# Patient Record
Sex: FEMALE | Race: ASIAN | NOT HISPANIC OR LATINO | Employment: FULL TIME | URBAN - METROPOLITAN AREA
[De-identification: names, ages, dates, MRNs, and addresses within clinical notes are randomized per-mention and may not be internally consistent; named-entity substitution may affect disease eponyms.]

---

## 2017-05-02 ENCOUNTER — HOSPITAL ENCOUNTER (OUTPATIENT)
Dept: CARDIOLOGY | Facility: CLINIC | Age: 42
Discharge: HOME | End: 2017-05-02
Payer: MEDICAID

## 2017-05-02 ENCOUNTER — OFFICE VISIT (OUTPATIENT)
Dept: CARDIOTHORACIC SURGERY | Facility: CLINIC | Age: 42
End: 2017-05-02
Payer: MEDICAID

## 2017-05-02 VITALS
DIASTOLIC BLOOD PRESSURE: 81 MMHG | OXYGEN SATURATION: 99 % | BODY MASS INDEX: 26.58 KG/M2 | WEIGHT: 150 LBS | SYSTOLIC BLOOD PRESSURE: 149 MMHG | HEIGHT: 63 IN | HEART RATE: 73 BPM | TEMPERATURE: 98 F

## 2017-05-02 DIAGNOSIS — I06.0 RHEUMATIC AORTIC STENOSIS: Primary | ICD-10-CM

## 2017-05-02 DIAGNOSIS — I05.0 MITRAL VALVE STENOSIS, RHEUMATIC: ICD-10-CM

## 2017-05-02 DIAGNOSIS — I05.0 MITRAL VALVE STENOSIS: ICD-10-CM

## 2017-05-02 LAB
AORTIC VALVE REGURGITATION: ABNORMAL
AORTIC VALVE STENOSIS: ABNORMAL
DIASTOLIC DYSFUNCTION: YES
ESTIMATED PA SYSTOLIC PRESSURE: 39.24
MITRAL VALVE REGURGITATION: ABNORMAL
MITRAL VALVE STENOSIS: ABNORMAL
RETIRED EF AND QEF - SEE NOTES: 65 (ref 55–65)
TRICUSPID VALVE REGURGITATION: ABNORMAL

## 2017-05-02 PROCEDURE — 99213 OFFICE O/P EST LOW 20 MIN: CPT | Mod: 25,PBBFAC | Performed by: THORACIC SURGERY (CARDIOTHORACIC VASCULAR SURGERY)

## 2017-05-02 PROCEDURE — 99999 PR PBB SHADOW E&M-EST. PATIENT-LVL III: CPT | Mod: 25,PBBFAC,, | Performed by: THORACIC SURGERY (CARDIOTHORACIC VASCULAR SURGERY)

## 2017-05-02 PROCEDURE — 93306 TTE W/DOPPLER COMPLETE: CPT | Mod: 26,S$PBB,, | Performed by: INTERNAL MEDICINE

## 2017-05-02 PROCEDURE — 99215 OFFICE O/P EST HI 40 MIN: CPT | Mod: S$PBB,,, | Performed by: THORACIC SURGERY (CARDIOTHORACIC VASCULAR SURGERY)

## 2017-05-02 NOTE — MR AVS SNAPSHOT
[facility] - Cardiovascular Surg  [address]  [facility] [address]  Phone: [phone]                  Rea Ibarra   2017 11:45 AM   Appointment    Description:  Female : 1975   Provider:  Farhat Ortega MD   Department:  [facility] - Cardiovascular Surg                To Do List           Future Appointments        Provider Department Dept Phone    2017 9:30 AM ECHO, [facility] - Echo/Stress Lab [phone]    2017 11:45 AM Farhat Ortega MD [facility] - Cardiovascular Surg [phone]      Goals (5 Years of Data)     None      [facility]     [facility] Nurse Care Line -  Assistance  Unless otherwise directed by your provider, please contact [facility], our nurse care line that is available for  assistance.     Registered nurses in the [facility] On Call Center provide: appointment scheduling, clinical advisement, health education, and other advisory services.  Call: [phone] (toll free)               Medications           Message regarding Medications     Verify the changes and/or additions to your medication regime listed below are the same as discussed with your clinician today.  If any of these changes or additions are incorrect, please notify your healthcare provider.             Verify that the below list of medications is an accurate representation of the medications you are currently taking.  If none reported, the list may be blank. If incorrect, please contact your healthcare provider. Carry this list with you in case of emergency.           Current Medications     ferrous sulfate 325 (65 FE) MG EC tablet Take 325 mg by mouth 3 (three) times daily with meals.    metoprolol tartrate (LOPRESSOR) 25 MG tablet Take 25 mg by mouth 2 (two) times daily.           Clinical Reference Information           Allergies as of 2017     No Known Allergies      Immunizations Administered on Date of Encounter - 2017     None      Smoking  Cessation     If you would like to quit smoking:   You may be eligible for free services if you are a [place] resident and started smoking cigarettes before September 1, 1988.  Call the Smoking Cessation Trust (SCT) toll free at (014) 689-6901 or (901) 949-8429.   Call 1-800-QUIT-NOW if you do not meet the above criteria.   Contact us via email: [email or web address]   View our website for more information: [email or web address]        Language Assistance Services     ATTENTION: Language assistance services are available, free of charge. Please call [phone].      ATENCIÓN: Si habla español, tiene a saldana disposición servicios gratuitos de asistencia lingüística. Llame al [phone].     CHÚ Ý: N?u b?n nói Ti?ng Vi?t, có các d?ch v? h? tr? ngôn ng? mi?n phí dành cho b?n. G?i s? [phone].         [facility] complies with applicable Federal civil rights laws and does not discriminate on the basis of race, color, national origin, age, disability, or sex.

## 2017-05-02 NOTE — PROGRESS NOTES
History & Physical    SUBJECTIVE:     History of Present Illness:  Patient is a 42 y.o. 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 + CURIEL that has forced her to quit working 4 months ago. + syncope (which is new). + angina (also new). She is here for 1 year follow up -- was recommended to have AVR/MVR, but wanted to wait until this summer since her grandchild was graduating. She reports a h/o of a ? cardiac arrest, unknown etiology. Denies TIA symptoms. Smoked pack/day for a year. No illicit drugs or alcohol use.     NHYA Class III    No LHC in our system    STS score 0.765        Chief Complaint   Patient presents with    Follow-up       Review of patient's allergies indicates:  No Known Allergies    Current Outpatient Prescriptions   Medication Sig Dispense Refill    ferrous sulfate 325 (65 FE) MG EC tablet Take 325 mg by mouth 3 (three) times daily with meals.      metoprolol tartrate (LOPRESSOR) 25 MG tablet Take 25 mg by mouth 2 (two) times daily.       No current facility-administered medications for this visit.        Past Medical History:   Diagnosis Date    Aortic stenosis     Bleeding ulcer     Iron deficiency anemia     Mitral stenosis     Myocardial infarction     about 2 years ago per patient     Past Surgical History:   Procedure Laterality Date    MANDIBLE FRACTURE SURGERY       Family History   Problem Relation Age of Onset    No Known Problems Mother     No Known Problems Father      Social History   Substance Use Topics    Smoking status: Current Every Day Smoker     Packs/day: 0.00     Types: Cigarettes    Smokeless tobacco: None      Comment: 2 to 3 cigarettes per week    Alcohol use No        Review of Systems:  Review of Systems   Constitutional: Positive for activity change and fatigue. Negative for appetite change, chills and fever.   HENT: Negative for congestion and trouble swallowing.    Eyes: Negative for visual disturbance.  "  Respiratory: Positive for shortness of breath. Negative for cough.    Cardiovascular: Positive for chest pain. Negative for leg swelling.   Gastrointestinal: Negative for abdominal distention, abdominal pain, constipation, diarrhea, nausea and vomiting.   Endocrine: Negative for cold intolerance and heat intolerance.   Genitourinary: Negative for difficulty urinating and dysuria.   Musculoskeletal: Negative for arthralgias and back pain.   Skin: Negative for rash and wound.   Neurological: Positive for syncope. Negative for dizziness and headaches.   Psychiatric/Behavioral: Negative for agitation and behavioral problems.       OBJECTIVE:     Vital Signs (Most Recent)  Temp: 98.2 °F (36.8 °C) (05/02/17 1124)  Pulse: 73 (05/02/17 1124)  BP: (!) 149/81 (05/02/17 1124)  SpO2: 99 % (05/02/17 1124)  5' 3" (1.6 m)  68 kg (150 lb)   BMI 25    Physical Exam:  Physical Exam   Constitutional: She is oriented to person, place, and time. She appears well-developed and well-nourished. No distress.   Cardiovascular: Normal rate and regular rhythm.  Exam reveals no gallop and no friction rub.    Murmur heard.  Crescendo murmur appreciated   Pulmonary/Chest: Effort normal and breath sounds normal. No respiratory distress. She has no wheezes. She has no rales.   Abdominal: Soft. Bowel sounds are normal.   Musculoskeletal: Normal range of motion. She exhibits no edema.   Neurological: She is alert and oriented to person, place, and time.   Skin: Skin is warm and dry.   Psychiatric: She has a normal mood and affect. Her behavior is normal.       Laboratory  Reviewed    Diagnostic Results:  R/L heart cath [place] 9/1/2015  -No CAD  Ao 97/71  /9  RAP 5  RVP 45/9  PAP 48/24  PCW 21    TTE OSH 6/2016  EF 60%, nml systolic, abnormal diastolic  Moderate MS  Moderate AS 1.1cm2, mean gradient 21.6  LAD 4.8cm severely enlarged  Moderate AR  PASP 23    ASSESSMENT/PLAN:     Ms Ibarra is a 42 y.o. female with RHD with moderate aortic and " mitral stenosis    PLAN:    - Plan for MVR/AVR -- wants mechanically valves -- understands need for lifelong coumadin  - Will discuss need for LHC prior to surgery with Dr Shannon HOLT Attending Note:    I have personally taken the history and examined this patient and agree with the resident's note as stated above. 41 yo F with RHD. Has AS and MS. Now with increased CURIEL and fatigue, had to quit working 4 months ago. I recommended AVR/MVR.  We discussed the risks and benefits of the proposed procedure, including but not limited to death, stroke, respiratory complications, renal complications, arrythmia, need for permanent pacemaker, and infection. Her questions have been answered, and she wishes to proceed. After a discussion of the advantages and disadvantages of mechanical and tissue prostheses, she desires mechanical valves. Her daughter was present for the discussion.

## 2017-05-04 ENCOUNTER — HOSPITAL ENCOUNTER (OUTPATIENT)
Dept: VASCULAR SURGERY | Facility: CLINIC | Age: 42
Discharge: HOME | End: 2017-05-04
Attending: THORACIC SURGERY (CARDIOTHORACIC VASCULAR SURGERY)
Payer: MEDICAID

## 2017-05-04 ENCOUNTER — HOSPITAL ENCOUNTER (OUTPATIENT)
Dept: RADIOLOGY | Facility: HOSPITAL | Age: 42
Discharge: HOME | End: 2017-05-04
Attending: THORACIC SURGERY (CARDIOTHORACIC VASCULAR SURGERY)
Payer: MEDICAID

## 2017-05-04 ENCOUNTER — HOSPITAL ENCOUNTER (OUTPATIENT)
Dept: PREADMISSION TESTING | Facility: HOSPITAL | Age: 42
Discharge: HOME | End: 2017-05-04
Attending: THORACIC SURGERY (CARDIOTHORACIC VASCULAR SURGERY)
Payer: MEDICAID

## 2017-05-04 ENCOUNTER — OFFICE VISIT (OUTPATIENT)
Dept: CARDIOTHORACIC SURGERY | Facility: CLINIC | Age: 42
End: 2017-05-04
Attending: THORACIC SURGERY (CARDIOTHORACIC VASCULAR SURGERY)
Payer: MEDICAID

## 2017-05-04 ENCOUNTER — HOSPITAL ENCOUNTER (OUTPATIENT)
Dept: CARDIOLOGY | Facility: CLINIC | Age: 42
Discharge: HOME | End: 2017-05-04
Attending: THORACIC SURGERY (CARDIOTHORACIC VASCULAR SURGERY)
Payer: MEDICAID

## 2017-05-04 ENCOUNTER — ANESTHESIA EVENT (OUTPATIENT)
Dept: SURGERY | Facility: HOSPITAL | Age: 42
DRG: 220 | End: 2017-05-04
Payer: MEDICAID

## 2017-05-04 ENCOUNTER — HOSPITAL ENCOUNTER (OUTPATIENT)
Dept: PULMONOLOGY | Facility: CLINIC | Age: 42
Discharge: HOME | End: 2017-05-04
Attending: THORACIC SURGERY (CARDIOTHORACIC VASCULAR SURGERY)
Payer: MEDICAID

## 2017-05-04 VITALS
HEIGHT: 62 IN | TEMPERATURE: 98 F | HEART RATE: 58 BPM | OXYGEN SATURATION: 100 % | DIASTOLIC BLOOD PRESSURE: 85 MMHG | SYSTOLIC BLOOD PRESSURE: 133 MMHG | WEIGHT: 153.81 LBS | BODY MASS INDEX: 28.31 KG/M2

## 2017-05-04 VITALS
DIASTOLIC BLOOD PRESSURE: 79 MMHG | WEIGHT: 154.13 LBS | SYSTOLIC BLOOD PRESSURE: 110 MMHG | OXYGEN SATURATION: 98 % | BODY MASS INDEX: 28.36 KG/M2 | HEIGHT: 62 IN | TEMPERATURE: 98 F | HEART RATE: 66 BPM | RESPIRATION RATE: 18 BRPM

## 2017-05-04 DIAGNOSIS — I05.0 MITRAL VALVE STENOSIS, RHEUMATIC: Primary | ICD-10-CM

## 2017-05-04 DIAGNOSIS — I05.0 MITRAL VALVE STENOSIS, RHEUMATIC: ICD-10-CM

## 2017-05-04 DIAGNOSIS — Z01.810 PRE-OPERATIVE CARDIOVASCULAR EXAMINATION: ICD-10-CM

## 2017-05-04 DIAGNOSIS — I06.0 RHEUMATIC AORTIC STENOSIS: ICD-10-CM

## 2017-05-04 LAB
PRE FEV1 FVC: 88
PRE FEV1: 2.74
PRE FVC: 3.13
PREDICTED FEV1 FVC: 85
PREDICTED FEV1: 2.6
PREDICTED FVC: 3.09

## 2017-05-04 PROCEDURE — 94010 BREATHING CAPACITY TEST: CPT | Mod: 26,S$PBB,, | Performed by: INTERNAL MEDICINE

## 2017-05-04 PROCEDURE — 71020 XR CHEST PA AND LATERAL: CPT | Mod: 26,,, | Performed by: RADIOLOGY

## 2017-05-04 PROCEDURE — 99213 OFFICE O/P EST LOW 20 MIN: CPT | Mod: 25,PBBFAC | Performed by: THORACIC SURGERY (CARDIOTHORACIC VASCULAR SURGERY)

## 2017-05-04 PROCEDURE — 93005 ELECTROCARDIOGRAM TRACING: CPT | Mod: PBBFAC | Performed by: INTERNAL MEDICINE

## 2017-05-04 PROCEDURE — 36600 WITHDRAWAL OF ARTERIAL BLOOD: CPT | Mod: 59,S$PBB,, | Performed by: INTERNAL MEDICINE

## 2017-05-04 PROCEDURE — 99999 PR PBB SHADOW E&M-EST. PATIENT-LVL III: CPT | Mod: 25,PBBFAC,, | Performed by: THORACIC SURGERY (CARDIOTHORACIC VASCULAR SURGERY)

## 2017-05-04 PROCEDURE — 93880 EXTRACRANIAL BILAT STUDY: CPT | Mod: 26,S$PBB,, | Performed by: SURGERY

## 2017-05-04 PROCEDURE — 99499 UNLISTED E&M SERVICE: CPT | Mod: S$PBB,,, | Performed by: THORACIC SURGERY (CARDIOTHORACIC VASCULAR SURGERY)

## 2017-05-04 PROCEDURE — 93010 ELECTROCARDIOGRAM REPORT: CPT | Mod: S$PBB,,, | Performed by: INTERNAL MEDICINE

## 2017-05-04 NOTE — MR AVS SNAPSHOT
[facility] - Cardiovascular Surg  [address]  [place] [address]  Phone: [phone]                  Rea Ibarra   2017 4:30 PM   Appointment    Description:  Female : 1975   Provider:  Farhat Ortega MD   Department:  [facility] - Cardiovascular Surg                To Do List           Future Appointments        Provider Department Dept Phone    2017 11:30 AM LAB, APPOINTMENT [place] [facility] [phone]    2017 12:00 PM [facility] [id number] LB LIMIT [facility] [phone]    2017 12:30 PM EKG, APPT [facility] - [phone]    2017  1:00 PM VASCULAR, LAB [facility] Vascular Laboratory [phone]    2017 1:30 PM PULMONARY FUNCTION [facility] Pulmonary Lab [phone]      Your Future Surgeries/Procedures     May 08, 2017   Surgery with Farhat Ortega MD   [facility]    [address] [facility] [address] [phone]              Goals (5 Years of Data)     None      [facility]     [facility] Nurse Care [facility] -  Assistance  Unless otherwise directed by your provider, please contact [facility], our nurse care line that is available for  assistance.     Registered nurses in the [facility] provide: appointment scheduling, clinical advisement, health education, and other advisory services.  Call: [phone] (toll free)               Medications           Message regarding Medications     Verify the changes and/or additions to your medication regime listed below are the same as discussed with your clinician today.  If any of these changes or additions are incorrect, please notify your healthcare provider.             Verify that the below list of medications is an accurate representation of the medications you are currently taking.  If none reported, the list may be blank. If incorrect, please contact your healthcare  provider. Carry this list with you in case of emergency.           Current Medications     ferrous sulfate 325 (65 FE) MG EC tablet Take 325 mg by mouth 3 (three) times daily with meals.    metoprolol tartrate (LOPRESSOR) 25 MG tablet Take 25 mg by mouth 2 (two) times daily.           Clinical Reference Information           Allergies as of 5/4/2017     No Known Allergies      Immunizations Administered on Date of Encounter - 5/4/2017     None      Smoking Cessation     If you would like to quit smoking:   You may be eligible for free services if you are a [place] resident and started smoking cigarettes before September 1, 1988.  Call the Smoking Cessation Trust ([facility]) toll free at (094) 277-4754 or (689) 252-8758.   Call 1-800-QUIT-NOW if you do not meet the above criteria.   Contact us via email: [email or web address]   View our website for more information: [email or web address]        Language Assistance Services     ATTENTION: Language assistance services are available, free of charge. Please call [phone].      ATENCIÓN: Si habla michele, tiene a saldana disposición servicios gratuitos de asistencia lingüística. Llame al [phone].     [facility] Ý: N?u b?n nói Ti?ng Vi?t, có các d?ch v? h? tr? ngôn ng? mi?n phí dành cho b?n. G?i s? [phone].         [facility] complies with applicable Federal civil rights laws and does not discriminate on the basis of race, color, national origin, age, disability, or sex.

## 2017-05-04 NOTE — ANESTHESIA PREPROCEDURE EVALUATION
"                                                                                                             05/04/2017  Pre-operative evaluation for Procedure(s) (LRB):  REPLACEMENT-VALVE-AORTIC (N/A)  REPLACEMENT-VALVE-MITRAL (N/A)    Rea Ibarra is a 42 y.o. female with PMH significant for questionable MI in 2008, moderate MS, and moderate AS (VILLA = 1.37 cm2, peak velocity = 3.28 m/s, mean gradient = 25.13 mmHg) who presents for procedures above.    Pt began to have symptomatic presentation of valvular "several years ago" which led investigation at hospital in [facility]. States that she has episodes of syncope, angina and LUE discomfort, all of which have been increasing in frequency over the years. States that it happens every 4-5 days.    Had recent TTE with results below. EF 60-65%, LAE, PA pressure 39, normal RV function, valvular issues stated above.    Stress test performed on 8/18/15, results located under media tab. Noted to be normal perfusion study with no perfusion defects noted.     LHC performed 9/1/15, located under media tab. Normal coronaries noted.    Prev airway: None on file    Patient Active Problem List   Diagnosis    History of MI (myocardial infarction)    Iron deficiency anemia due to chronic blood loss    IFG (impaired fasting glucose)    Rheumatic aortic stenosis    Mitral valve stenosis, rheumatic    Pre-operative cardiovascular examination       Review of patient's allergies indicates:  No Known Allergies     Current Outpatient Prescriptions on File Prior to Encounter   Medication Sig Dispense Refill    ferrous sulfate 325 (65 FE) MG EC tablet Take 325 mg by mouth 3 (three) times daily with meals.      metoprolol tartrate (LOPRESSOR) 25 MG tablet Take 25 mg by mouth 2 (two) times daily.       No current facility-administered medications on file prior to encounter.        Past Surgical History:   Procedure Laterality Date    MANDIBLE FRACTURE SURGERY         Social History "     Social History    Marital status:      Spouse name: N/A    Number of children: N/A    Years of education: N/A     Occupational History    Not on file.     Social History Main Topics    Smoking status: Current Every Day Smoker     Packs/day: 0.00     Types: Cigarettes    Smokeless tobacco: Not on file      Comment: 2 to 3 cigarettes per week    Alcohol use No    Drug use: No    Sexual activity: Not on file     Other Topics Concern    Not on file     Social History Narrative         Vital Signs Range (Last 24H):         CBC:   Recent Labs      05/04/17   1105   WBC  5.70   RBC  5.30   HGB  16.1*   HCT  46.1   PLT  196   MCV  87   MCH  30.4   MCHC  34.9       CMP:   Recent Labs      05/04/17   1105   NA  138   K  4.1   CL  105   CO2  23   BUN  10   CREATININE  0.9   GLU  88   CALCIUM  10.0   ALBUMIN  4.4   PROT  7.6   ALKPHOS  62   ALT  12   AST  17   BILITOT  1.0       INR  Recent Labs      05/04/17   1105   INR  1.4*   APTT  34.2*       Diagnostic Studies:  CXR 5/4/17:   Abnormal but stable contour of the mediastinum consistent with LEFT atrial appendage thrombus in this patient with a history of aortic and mitral stenosis due to rheumatic valve disease.  Normal pulmonary vascular distribution argues for normal LVEDP in this patient with aortic valve disease.    EKG 7/9/15:  Normal sinus rhythm  First degree AV block  Incomplete right bundle branch block  No previous ECGs available    EKG for 5/4 scheduled    2D Echo 5/2/17:    CONCLUSIONS     1 - Normal left ventricular systolic function (EF 60-65%).     2 - Eccentric hypertrophy.     3 - Severe left atrial enlargement.     4 - Normal right ventricular systolic function .     5 - The estimated PA systolic pressure is 39 mmHg.     6 - Mild to moderate aortic stenosis, VILLA = 1.37 cm2, peak velocity = 3.28 m/s, mean gradient = 25.13 mmHg.     7 - Mild aortic regurgitation.     8 - Moderate mitral stenosis, MVA = 1.22 cm2.     9 - Trivial to mild  mitral regurgitation.     10 - Mild tricuspid regurgitation.  Anesthesia Evaluation    I have reviewed the Patient Summary Reports.    I have reviewed the Nursing Notes.   I have reviewed the Medications.     Review of Systems  Anesthesia Hx:  History of prior surgery of interest to airway management or planning: Denies Family Hx of Anesthesia complications.   Denies Personal Hx of Anesthesia complications.   Social:  Former Smoker, No Alcohol Use Quit smoking a year ago   Cardiovascular:   Exercise tolerance: poor Past MI Denies CAD.    Denies CABG/stent.  Denies Dysrhythmias.  Angina, with exertion CURIEL    Pulmonary:   Denies Asthma.  Denies Recent URI.    Renal/:  Renal/ Normal     Hepatic/GI:  Hepatic/GI Normal    Musculoskeletal:  Musculoskeletal Normal    Neurological:  Neurology Normal    Endocrine:  Endocrine Normal        Physical Exam  General:  Well nourished    Airway/Jaw/Neck:  Airway Findings: Mouth Opening: Normal Tongue: Normal  Mallampati: III  Improves to II with phonation.  TM Distance: Normal, at least 6 cm  Jaw/Neck Findings:  Neck ROM: Normal ROM      Dental:  Dental Findings: Upper Dentures, In tact   Chest/Lungs:  Chest/Lungs Findings: Clear to auscultation, Normal Respiratory Rate     Heart/Vascular:  Heart Findings: Rate: Normal  Rhythm: Regular Rhythm  Heart Murmur  Systolic  Systolic Heart Murmur Description: Apical, R Upper Sternal Border, Holosystolic        Mental Status:  Mental Status Findings:  Cooperative, Alert and Oriented         Anesthesia Plan  Type of Anesthesia, risks & benefits discussed:  Anesthesia Type:  general  Patient's Preference:   Intra-op Monitoring Plan: arterial line, central line and standard ASA monitors  Intra-op Monitoring Plan Comments:   Post Op Pain Control Plan:   Post Op Pain Control Plan Comments:   Induction:   IV and Inhalation  Beta Blocker:  Patient is on a Beta-Blocker and has received one dose within the past 24 hours (No further documentation  required).       Informed Consent:    ASA Score: 4     Day of Surgery Review of History & Physical:        Anesthesia Plan Notes: Discussed intra-op course with patient and family - answered all questions to their satisfaction. Discussed placement of arterial line (potentially awake) and central line. Discussed potential use of BRYANT intra-op. Discussed post-op care that will likely include ICU; also, discussed with patient and family about possibility of patient remaining intubated post-op with extubation to occur when deemed appropriate by primary team.        Ready For Surgery From Anesthesia Perspective.

## 2017-05-04 NOTE — PROGRESS NOTES
History & Physical    SUBJECTIVE:     History of Present Illness:  Patient is a 42 y.o. 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 + CURIEL that has forced her to quit working 4 months ago. + syncope (which is new). + angina (also new). She is here for 1 year follow up -- was recommended to have AVR/MVR, but wanted to wait until this summer since her grandchild was graduating. She reports a h/o of a ? cardiac arrest, unknown etiology. Denies TIA symptoms. Smoked pack/day for a year. No illicit drugs or alcohol use.     NHYA Class III    No LHC in our system    STS score 0.765        Chief Complaint   Patient presents with    Pre-op Exam       Review of patient's allergies indicates:  No Known Allergies    Current Outpatient Prescriptions   Medication Sig Dispense Refill    ferrous sulfate 325 (65 FE) MG EC tablet Take 325 mg by mouth 3 (three) times daily with meals.      metoprolol tartrate (LOPRESSOR) 25 MG tablet Take 25 mg by mouth 2 (two) times daily.       No current facility-administered medications for this visit.        Past Medical History:   Diagnosis Date    Aortic stenosis     Bleeding ulcer     Iron deficiency anemia     Mitral stenosis     Myocardial infarction     about 2 years ago per patient     Past Surgical History:   Procedure Laterality Date    MANDIBLE FRACTURE SURGERY       Family History   Problem Relation Age of Onset    No Known Problems Mother     No Known Problems Father      Social History   Substance Use Topics    Smoking status: Current Every Day Smoker     Packs/day: 0.00     Types: Cigarettes    Smokeless tobacco: None      Comment: 2 to 3 cigarettes per week    Alcohol use No        Review of Systems:  Review of Systems   Constitutional: Positive for activity change and fatigue. Negative for appetite change, chills and fever.   HENT: Negative for congestion and trouble swallowing.    Eyes: Negative for visual disturbance.  "  Respiratory: Positive for shortness of breath. Negative for cough.    Cardiovascular: Positive for chest pain. Negative for leg swelling.   Gastrointestinal: Negative for abdominal distention, abdominal pain, constipation, diarrhea, nausea and vomiting.   Endocrine: Negative for cold intolerance and heat intolerance.   Genitourinary: Negative for difficulty urinating and dysuria.   Musculoskeletal: Negative for arthralgias and back pain.   Skin: Negative for rash and wound.   Neurological: Positive for syncope. Negative for dizziness and headaches.   Psychiatric/Behavioral: Negative for agitation and behavioral problems.       OBJECTIVE:     Vital Signs (Most Recent)  Temp: 98.2 °F (36.8 °C) (05/04/17 1539)  Pulse: (!) 58 (05/04/17 1539)  BP: 133/85 (05/04/17 1539)  SpO2: 100 % (05/04/17 1539)  5' 2" (1.575 m)  69.8 kg (153 lb 12.8 oz)   BMI 25    Physical Exam:  Physical Exam   Constitutional: She is oriented to person, place, and time. She appears well-developed and well-nourished. No distress.   Cardiovascular: Normal rate and regular rhythm.  Exam reveals no gallop and no friction rub.    Murmur heard.  Crescendo murmur appreciated   Pulmonary/Chest: Effort normal and breath sounds normal. No respiratory distress. She has no wheezes. She has no rales.   Abdominal: Soft. Bowel sounds are normal.   Musculoskeletal: Normal range of motion. She exhibits no edema.   Neurological: She is alert and oriented to person, place, and time.   Skin: Skin is warm and dry.   Psychiatric: She has a normal mood and affect. Her behavior is normal.       Laboratory  Reviewed    Diagnostic Results:  R/L heart cath [place] 9/1/2015  -No CAD  Ao 97/71  /9  RAP 5  RVP 45/9  PAP 48/24  PCW 21    TTE OSH 6/2016  EF 60%, nml systolic, abnormal diastolic  Moderate MS  Moderate AS 1.1cm2, mean gradient 21.6  LAD 4.8cm severely enlarged  Moderate AR  PASP 23    ASSESSMENT/PLAN:     Ms Ibarra is a 42 y.o. female with RHD with " moderate aortic and mitral stenosis    PLAN:    - Plan for MVR/AVR -- she desires mechanically valves -- understands need for lifelong coumadin  - Consents obtained      CTS Attending Note:    I have personally taken the history and examined this patient and agree with the resident's note as stated above.  Plan AVR/MVR. Questions answered. She wishes to proceed.

## 2017-05-08 ENCOUNTER — HOSPITAL ENCOUNTER (INPATIENT)
Facility: HOSPITAL | Age: 42
LOS: 5 days | Discharge: HOME | DRG: 220 | End: 2017-05-13
Attending: THORACIC SURGERY (CARDIOTHORACIC VASCULAR SURGERY) | Admitting: THORACIC SURGERY (CARDIOTHORACIC VASCULAR SURGERY)
Payer: MEDICAID

## 2017-05-08 ENCOUNTER — ANESTHESIA (OUTPATIENT)
Dept: SURGERY | Facility: HOSPITAL | Age: 42
DRG: 220 | End: 2017-05-08
Payer: MEDICAID

## 2017-05-08 ENCOUNTER — SURGERY (OUTPATIENT)
Age: 42
End: 2017-05-08

## 2017-05-08 DIAGNOSIS — Z99.11 ENCOUNTER FOR WEANING FROM VENTILATOR: ICD-10-CM

## 2017-05-08 DIAGNOSIS — Z95.2 S/P AVR (AORTIC VALVE REPLACEMENT): ICD-10-CM

## 2017-05-08 DIAGNOSIS — I48.0 PAROXYSMAL A-FIB: ICD-10-CM

## 2017-05-08 DIAGNOSIS — I05.0 MITRAL VALVE STENOSIS, RHEUMATIC: ICD-10-CM

## 2017-05-08 DIAGNOSIS — R73.9 STRESS HYPERGLYCEMIA: ICD-10-CM

## 2017-05-08 DIAGNOSIS — E83.39 HYPOPHOSPHATEMIA: Primary | ICD-10-CM

## 2017-05-08 DIAGNOSIS — I35.0 AORTIC STENOSIS: ICD-10-CM

## 2017-05-08 DIAGNOSIS — I49.8 ATRIAL ARRHYTHMIA: ICD-10-CM

## 2017-05-08 DIAGNOSIS — Z01.810 PRE-OPERATIVE CARDIOVASCULAR EXAMINATION: ICD-10-CM

## 2017-05-08 DIAGNOSIS — D50.0 IRON DEFICIENCY ANEMIA DUE TO CHRONIC BLOOD LOSS: ICD-10-CM

## 2017-05-08 DIAGNOSIS — Z95.2 S/P MVR (MITRAL VALVE REPLACEMENT): ICD-10-CM

## 2017-05-08 DIAGNOSIS — I48.91 ATRIAL FIBRILLATION: ICD-10-CM

## 2017-05-08 DIAGNOSIS — R73.01 IFG (IMPAIRED FASTING GLUCOSE): ICD-10-CM

## 2017-05-08 DIAGNOSIS — I06.0 RHEUMATIC AORTIC STENOSIS: ICD-10-CM

## 2017-05-08 DIAGNOSIS — I25.2 HISTORY OF MI (MYOCARDIAL INFARCTION): ICD-10-CM

## 2017-05-08 DIAGNOSIS — Z98.890 STATUS POST CARDIAC SURGERY: ICD-10-CM

## 2017-05-08 LAB
ALLENS TEST: ABNORMAL
ANION GAP SERPL CALC-SCNC: 8 MMOL/L
APTT BLDCRRT: 33.8 SEC
BASOPHILS # BLD AUTO: 0.03 K/UL
BASOPHILS NFR BLD: 0.2 %
BUN SERPL-MCNC: 9 MG/DL
CALCIUM SERPL-MCNC: 7.7 MG/DL
CHLORIDE SERPL-SCNC: 112 MMOL/L
CO2 SERPL-SCNC: 24 MMOL/L
CREAT SERPL-MCNC: 0.8 MG/DL
DELSYS: ABNORMAL
DIFFERENTIAL METHOD: ABNORMAL
EOSINOPHIL # BLD AUTO: 0.1 K/UL
EOSINOPHIL NFR BLD: 0.8 %
ERYTHROCYTE [DISTWIDTH] IN BLOOD BY AUTOMATED COUNT: 12.8 %
ERYTHROCYTE [SEDIMENTATION RATE] IN BLOOD BY WESTERGREN METHOD: 14 MM/H
EST. GFR  (AFRICAN AMERICAN): >60 ML/MIN/1.73 M^2
EST. GFR  (NON AFRICAN AMERICAN): >60 ML/MIN/1.73 M^2
FIBRINOGEN PPP-MCNC: 124 MG/DL
FIO2: 100
FIO2: 60
FIO2: 60
FIO2: 70
FIO2: 80
FIO2: 80
FLOW: 60
GLUCOSE SERPL-MCNC: 100 MG/DL (ref 70–110)
GLUCOSE SERPL-MCNC: 149 MG/DL (ref 70–110)
GLUCOSE SERPL-MCNC: 162 MG/DL
GLUCOSE SERPL-MCNC: 167 MG/DL (ref 70–110)
GLUCOSE SERPL-MCNC: 179 MG/DL (ref 70–110)
GLUCOSE SERPL-MCNC: 184 MG/DL (ref 70–110)
GLUCOSE SERPL-MCNC: 205 MG/DL (ref 70–110)
GLUCOSE SERPL-MCNC: 98 MG/DL (ref 70–110)
HCO3 UR-SCNC: 20.9 MMOL/L (ref 24–28)
HCO3 UR-SCNC: 21.3 MMOL/L (ref 24–28)
HCO3 UR-SCNC: 24.2 MMOL/L (ref 24–28)
HCO3 UR-SCNC: 24.5 MMOL/L (ref 24–28)
HCO3 UR-SCNC: 25 MMOL/L (ref 24–28)
HCO3 UR-SCNC: 25.3 MMOL/L (ref 24–28)
HCO3 UR-SCNC: 27.1 MMOL/L (ref 24–28)
HCO3 UR-SCNC: 29 MMOL/L (ref 24–28)
HCT VFR BLD AUTO: 33.8 %
HCT VFR BLD CALC: 28 %PCV (ref 36–54)
HCT VFR BLD CALC: 31 %PCV (ref 36–54)
HCT VFR BLD CALC: 33 %PCV (ref 36–54)
HCT VFR BLD CALC: 35 %PCV (ref 36–54)
HCT VFR BLD CALC: 40 %PCV (ref 36–54)
HGB BLD-MCNC: 11.2 G/DL
INR PPP: 1.3
LYMPHOCYTES # BLD AUTO: 2.8 K/UL
LYMPHOCYTES NFR BLD: 16.4 %
MAGNESIUM SERPL-MCNC: 2.6 MG/DL
MCH RBC QN AUTO: 29.6 PG
MCHC RBC AUTO-ENTMCNC: 33.1 %
MCV RBC AUTO: 89 FL
MODE: ABNORMAL
MONOCYTES # BLD AUTO: 0.9 K/UL
MONOCYTES NFR BLD: 5.1 %
NEUTROPHILS # BLD AUTO: 13.4 K/UL
NEUTROPHILS NFR BLD: 77.3 %
PCO2 BLDA: 29.1 MMHG (ref 35–45)
PCO2 BLDA: 32.4 MMHG (ref 35–45)
PCO2 BLDA: 35.2 MMHG (ref 35–45)
PCO2 BLDA: 37.8 MMHG (ref 35–45)
PCO2 BLDA: 39.5 MMHG (ref 35–45)
PCO2 BLDA: 40.9 MMHG (ref 35–45)
PCO2 BLDA: 58.1 MMHG (ref 35–45)
PCO2 BLDA: 62.4 MMHG (ref 35–45)
PEEP: 5
PH SMN: 7.28 [PH] (ref 7.35–7.45)
PH SMN: 7.28 [PH] (ref 7.35–7.45)
PH SMN: 7.38 [PH] (ref 7.35–7.45)
PH SMN: 7.41 [PH] (ref 7.35–7.45)
PH SMN: 7.42 [PH] (ref 7.35–7.45)
PH SMN: 7.42 [PH] (ref 7.35–7.45)
PH SMN: 7.46 [PH] (ref 7.35–7.45)
PH SMN: 7.47 [PH] (ref 7.35–7.45)
PHOSPHATE SERPL-MCNC: 3 MG/DL
PIP: 23
PLATELET # BLD AUTO: 110 K/UL
PMV BLD AUTO: 9.4 FL
PO2 BLDA: 145 MMHG (ref 80–100)
PO2 BLDA: 165 MMHG (ref 80–100)
PO2 BLDA: 40 MMHG (ref 40–60)
PO2 BLDA: 40 MMHG (ref 40–60)
PO2 BLDA: 43 MMHG (ref 40–60)
PO2 BLDA: 500 MMHG (ref 80–100)
PO2 BLDA: 55 MMHG (ref 40–60)
PO2 BLDA: 58 MMHG (ref 40–60)
POC BE: -1 MMOL/L
POC BE: -2 MMOL/L
POC BE: -4 MMOL/L
POC BE: 0 MMOL/L
POC BE: 1 MMOL/L
POC BE: 2 MMOL/L
POC IONIZED CALCIUM: 0.98 MMOL/L (ref 1.06–1.42)
POC IONIZED CALCIUM: 1.02 MMOL/L (ref 1.06–1.42)
POC IONIZED CALCIUM: 1.02 MMOL/L (ref 1.06–1.42)
POC IONIZED CALCIUM: 1.03 MMOL/L (ref 1.06–1.42)
POC IONIZED CALCIUM: 1.05 MMOL/L (ref 1.06–1.42)
POC IONIZED CALCIUM: 1.07 MMOL/L (ref 1.06–1.42)
POC IONIZED CALCIUM: 1.11 MMOL/L (ref 1.06–1.42)
POC IONIZED CALCIUM: 1.16 MMOL/L (ref 1.06–1.42)
POC SATURATED O2: 100 % (ref 95–100)
POC SATURATED O2: 100 % (ref 95–100)
POC SATURATED O2: 79 % (ref 95–100)
POC SATURATED O2: 80 % (ref 95–100)
POC SATURATED O2: 80 % (ref 95–100)
POC SATURATED O2: 83 % (ref 95–100)
POC SATURATED O2: 85 % (ref 95–100)
POC SATURATED O2: 99 % (ref 95–100)
POC TCO2: 22 MMOL/L (ref 24–29)
POC TCO2: 22 MMOL/L (ref 24–29)
POC TCO2: 25 MMOL/L (ref 23–27)
POC TCO2: 26 MMOL/L (ref 23–27)
POC TCO2: 26 MMOL/L (ref 23–27)
POC TCO2: 26 MMOL/L (ref 24–29)
POC TCO2: 29 MMOL/L (ref 24–29)
POC TCO2: 31 MMOL/L (ref 24–29)
POCT GLUCOSE: 138 MG/DL (ref 70–110)
POCT GLUCOSE: 140 MG/DL (ref 70–110)
POCT GLUCOSE: 144 MG/DL (ref 70–110)
POCT GLUCOSE: 176 MG/DL (ref 70–110)
POTASSIUM BLD-SCNC: 3.3 MMOL/L (ref 3.5–5.1)
POTASSIUM BLD-SCNC: 3.7 MMOL/L (ref 3.5–5.1)
POTASSIUM BLD-SCNC: 3.8 MMOL/L (ref 3.5–5.1)
POTASSIUM BLD-SCNC: 3.9 MMOL/L (ref 3.5–5.1)
POTASSIUM BLD-SCNC: 4 MMOL/L (ref 3.5–5.1)
POTASSIUM BLD-SCNC: 4.4 MMOL/L (ref 3.5–5.1)
POTASSIUM BLD-SCNC: 4.8 MMOL/L (ref 3.5–5.1)
POTASSIUM BLD-SCNC: 4.9 MMOL/L (ref 3.5–5.1)
POTASSIUM SERPL-SCNC: 3.5 MMOL/L
POTASSIUM SERPL-SCNC: 3.5 MMOL/L
POTASSIUM SERPL-SCNC: 3.7 MMOL/L
PROTHROMBIN TIME: 13.3 SEC
PS: 10
RBC # BLD AUTO: 3.79 M/UL
SAMPLE: ABNORMAL
SITE: ABNORMAL
SODIUM BLD-SCNC: 141 MMOL/L (ref 136–145)
SODIUM BLD-SCNC: 143 MMOL/L (ref 136–145)
SODIUM BLD-SCNC: 144 MMOL/L (ref 136–145)
SODIUM BLD-SCNC: 145 MMOL/L (ref 136–145)
SODIUM SERPL-SCNC: 144 MMOL/L
SP02: 100
VT: 500
WBC # BLD AUTO: 17.36 K/UL

## 2017-05-08 PROCEDURE — 27201423 OPTIME MED/SURG SUP & DEVICES STERILE SUPPLY: Performed by: THORACIC SURGERY (CARDIOTHORACIC VASCULAR SURGERY)

## 2017-05-08 PROCEDURE — 86920 COMPATIBILITY TEST SPIN: CPT

## 2017-05-08 PROCEDURE — C1729 CATH, DRAINAGE: HCPCS | Performed by: THORACIC SURGERY (CARDIOTHORACIC VASCULAR SURGERY)

## 2017-05-08 PROCEDURE — 37799 UNLISTED PX VASCULAR SURGERY: CPT

## 2017-05-08 PROCEDURE — 37000008 HC ANESTHESIA 1ST 15 MINUTES: Performed by: THORACIC SURGERY (CARDIOTHORACIC VASCULAR SURGERY)

## 2017-05-08 PROCEDURE — 27200678 HC TRANSDUCER MONITOR KIT TRIPLE: Performed by: ANESTHESIOLOGY

## 2017-05-08 PROCEDURE — 84295 ASSAY OF SERUM SODIUM: CPT

## 2017-05-08 PROCEDURE — 88305 TISSUE EXAM BY PATHOLOGIST: CPT | Performed by: PATHOLOGY

## 2017-05-08 PROCEDURE — 85014 HEMATOCRIT: CPT

## 2017-05-08 PROCEDURE — 82803 BLOOD GASES ANY COMBINATION: CPT

## 2017-05-08 PROCEDURE — C1751 CATH, INF, PER/CENT/MIDLINE: HCPCS | Performed by: ANESTHESIOLOGY

## 2017-05-08 PROCEDURE — 33430 REPLACEMENT OF MITRAL VALVE: CPT | Mod: ,,, | Performed by: THORACIC SURGERY (CARDIOTHORACIC VASCULAR SURGERY)

## 2017-05-08 PROCEDURE — 5A1221Z PERFORMANCE OF CARDIAC OUTPUT, CONTINUOUS: ICD-10-PCS | Performed by: THORACIC SURGERY (CARDIOTHORACIC VASCULAR SURGERY)

## 2017-05-08 PROCEDURE — 85384 FIBRINOGEN ACTIVITY: CPT

## 2017-05-08 PROCEDURE — 25000003 PHARM REV CODE 250

## 2017-05-08 PROCEDURE — 27100088 HC CELL SAVER

## 2017-05-08 PROCEDURE — 27000221 HC OXYGEN, UP TO 24 HOURS

## 2017-05-08 PROCEDURE — 84132 ASSAY OF SERUM POTASSIUM: CPT

## 2017-05-08 PROCEDURE — 27100025 HC TUBING, SET FLUID WARMER: Performed by: ANESTHESIOLOGY

## 2017-05-08 PROCEDURE — 63600175 PHARM REV CODE 636 W HCPCS: Performed by: ANESTHESIOLOGY

## 2017-05-08 PROCEDURE — 02RF0JZ REPLACEMENT OF AORTIC VALVE WITH SYNTHETIC SUBSTITUTE, OPEN APPROACH: ICD-10-PCS | Performed by: THORACIC SURGERY (CARDIOTHORACIC VASCULAR SURGERY)

## 2017-05-08 PROCEDURE — 36620 INSERTION CATHETER ARTERY: CPT | Mod: 59,,, | Performed by: ANESTHESIOLOGY

## 2017-05-08 PROCEDURE — 27000191 HC C-V MONITORING

## 2017-05-08 PROCEDURE — 94003 VENT MGMT INPAT SUBQ DAY: CPT

## 2017-05-08 PROCEDURE — P9045 ALBUMIN (HUMAN), 5%, 250 ML: HCPCS

## 2017-05-08 PROCEDURE — 85025 COMPLETE CBC W/AUTO DIFF WBC: CPT

## 2017-05-08 PROCEDURE — 27200953 HC CARDIOPLEGIA SYSTEM

## 2017-05-08 PROCEDURE — 99232 SBSQ HOSP IP/OBS MODERATE 35: CPT | Mod: ,,, | Performed by: NURSE PRACTITIONER

## 2017-05-08 PROCEDURE — 83735 ASSAY OF MAGNESIUM: CPT

## 2017-05-08 PROCEDURE — 37000009 HC ANESTHESIA EA ADD 15 MINS: Performed by: THORACIC SURGERY (CARDIOTHORACIC VASCULAR SURGERY)

## 2017-05-08 PROCEDURE — 82330 ASSAY OF CALCIUM: CPT

## 2017-05-08 PROCEDURE — 02RG0JZ REPLACEMENT OF MITRAL VALVE WITH SYNTHETIC SUBSTITUTE, OPEN APPROACH: ICD-10-PCS | Performed by: THORACIC SURGERY (CARDIOTHORACIC VASCULAR SURGERY)

## 2017-05-08 PROCEDURE — 36000712 HC OR TIME LEV V 1ST 15 MIN: Performed by: THORACIC SURGERY (CARDIOTHORACIC VASCULAR SURGERY)

## 2017-05-08 PROCEDURE — 27800903 OPTIME MED/SURG SUP & DEVICES OTHER IMPLANTS: Performed by: THORACIC SURGERY (CARDIOTHORACIC VASCULAR SURGERY)

## 2017-05-08 PROCEDURE — 63600175 PHARM REV CODE 636 W HCPCS

## 2017-05-08 PROCEDURE — 85520 HEPARIN ASSAY: CPT

## 2017-05-08 PROCEDURE — 36556 INSERT NON-TUNNEL CV CATH: CPT | Mod: 59,,, | Performed by: ANESTHESIOLOGY

## 2017-05-08 PROCEDURE — D9220A PRA ANESTHESIA: Mod: ,,, | Performed by: ANESTHESIOLOGY

## 2017-05-08 PROCEDURE — 94761 N-INVAS EAR/PLS OXIMETRY MLT: CPT

## 2017-05-08 PROCEDURE — 99223 1ST HOSP IP/OBS HIGH 75: CPT | Mod: ,,, | Performed by: SURGERY

## 2017-05-08 PROCEDURE — 25000003 PHARM REV CODE 250: Performed by: THORACIC SURGERY (CARDIOTHORACIC VASCULAR SURGERY)

## 2017-05-08 PROCEDURE — 36000713 HC OR TIME LEV V EA ADD 15 MIN: Performed by: THORACIC SURGERY (CARDIOTHORACIC VASCULAR SURGERY)

## 2017-05-08 PROCEDURE — 27000175 HC ADULT BYPASS PUMP

## 2017-05-08 PROCEDURE — 84100 ASSAY OF PHOSPHORUS: CPT

## 2017-05-08 PROCEDURE — 93010 ELECTROCARDIOGRAM REPORT: CPT | Mod: ,,, | Performed by: INTERNAL MEDICINE

## 2017-05-08 PROCEDURE — 89220 SPUTUM SPECIMEN COLLECTION: CPT

## 2017-05-08 PROCEDURE — 88305 TISSUE EXAM BY PATHOLOGIST: CPT | Mod: 26,,, | Performed by: PATHOLOGY

## 2017-05-08 PROCEDURE — 27100021 HC MULTIPORT INFUSION MANIFOLD: Performed by: ANESTHESIOLOGY

## 2017-05-08 PROCEDURE — 85610 PROTHROMBIN TIME: CPT

## 2017-05-08 PROCEDURE — 20000000 HC ICU ROOM

## 2017-05-08 PROCEDURE — 25000003 PHARM REV CODE 250: Performed by: ANESTHESIOLOGY

## 2017-05-08 PROCEDURE — 27201037 HC PRESSURE MONITORING SET UP

## 2017-05-08 PROCEDURE — 36592 COLLECT BLOOD FROM PICC: CPT

## 2017-05-08 PROCEDURE — 27800595 HC HEART VALVES

## 2017-05-08 PROCEDURE — 80048 BASIC METABOLIC PNL TOTAL CA: CPT

## 2017-05-08 PROCEDURE — 93312 ECHO TRANSESOPHAGEAL: CPT | Mod: 26,59,, | Performed by: ANESTHESIOLOGY

## 2017-05-08 PROCEDURE — 85730 THROMBOPLASTIN TIME PARTIAL: CPT

## 2017-05-08 PROCEDURE — 33405 REPLACEMENT AORTIC VALVE OPN: CPT | Mod: 51,,, | Performed by: THORACIC SURGERY (CARDIOTHORACIC VASCULAR SURGERY)

## 2017-05-08 PROCEDURE — 94002 VENT MGMT INPAT INIT DAY: CPT

## 2017-05-08 DEVICE — IMPLANTABLE DEVICE: Type: IMPLANTABLE DEVICE | Site: HEART | Status: FUNCTIONAL

## 2017-05-08 RX ORDER — LIDOCAINE HYDROCHLORIDE 10 MG/ML
1 INJECTION, SOLUTION EPIDURAL; INFILTRATION; INTRACAUDAL; PERINEURAL ONCE
Status: DISCONTINUED | OUTPATIENT
Start: 2017-05-08 | End: 2017-05-09

## 2017-05-08 RX ORDER — HYDROCODONE BITARTRATE AND ACETAMINOPHEN 500; 5 MG/1; MG/1
TABLET ORAL
Status: DISCONTINUED | OUTPATIENT
Start: 2017-05-08 | End: 2017-05-09

## 2017-05-08 RX ORDER — MIDAZOLAM HYDROCHLORIDE 1 MG/ML
INJECTION, SOLUTION INTRAMUSCULAR; INTRAVENOUS
Status: DISCONTINUED | OUTPATIENT
Start: 2017-05-08 | End: 2017-05-08

## 2017-05-08 RX ORDER — NICARDIPINE HYDROCHLORIDE 0.2 MG/ML
5 INJECTION INTRAVENOUS CONTINUOUS
Status: DISCONTINUED | OUTPATIENT
Start: 2017-05-08 | End: 2017-05-09

## 2017-05-08 RX ORDER — NITROGLYCERIN 5 MG/ML
INJECTION, SOLUTION INTRAVENOUS
Status: DISCONTINUED | OUTPATIENT
Start: 2017-05-08 | End: 2017-05-08

## 2017-05-08 RX ORDER — LIDOCAINE HYDROCHLORIDE 10 MG/ML
1 INJECTION, SOLUTION EPIDURAL; INFILTRATION; INTRACAUDAL; PERINEURAL ONCE
Status: DISCONTINUED | OUTPATIENT
Start: 2017-05-08 | End: 2017-05-08 | Stop reason: HOSPADM

## 2017-05-08 RX ORDER — CALCIUM GLUCONATE 98 MG/ML
INJECTION, SOLUTION INTRAVENOUS
Status: DISCONTINUED | OUTPATIENT
Start: 2017-05-08 | End: 2017-05-08

## 2017-05-08 RX ORDER — NOREPINEPHRINE BITARTRATE 1 MG/ML
INJECTION, SOLUTION INTRAVENOUS
Status: DISCONTINUED | OUTPATIENT
Start: 2017-05-08 | End: 2017-05-08

## 2017-05-08 RX ORDER — IPRATROPIUM BROMIDE AND ALBUTEROL SULFATE 2.5; .5 MG/3ML; MG/3ML
3 SOLUTION RESPIRATORY (INHALATION) EVERY 6 HOURS PRN
Status: DISCONTINUED | OUTPATIENT
Start: 2017-05-08 | End: 2017-05-13

## 2017-05-08 RX ORDER — MORPHINE SULFATE 2 MG/ML
2 INJECTION, SOLUTION INTRAMUSCULAR; INTRAVENOUS
Status: DISCONTINUED | OUTPATIENT
Start: 2017-05-08 | End: 2017-05-13

## 2017-05-08 RX ORDER — ONDANSETRON HYDROCHLORIDE 4 MG/5ML
4 SOLUTION ORAL ONCE
Status: DISCONTINUED | OUTPATIENT
Start: 2017-05-08 | End: 2017-05-09

## 2017-05-08 RX ORDER — FENTANYL CITRATE 50 UG/ML
INJECTION, SOLUTION INTRAMUSCULAR; INTRAVENOUS
Status: DISCONTINUED | OUTPATIENT
Start: 2017-05-08 | End: 2017-05-08

## 2017-05-08 RX ORDER — ASCORBIC ACID 500 MG
500 TABLET ORAL 2 TIMES DAILY
Status: DISCONTINUED | OUTPATIENT
Start: 2017-05-08 | End: 2017-05-13 | Stop reason: HOSPADM

## 2017-05-08 RX ORDER — CEFAZOLIN SODIUM 2 G/50ML
2 SOLUTION INTRAVENOUS
Status: COMPLETED | OUTPATIENT
Start: 2017-05-08 | End: 2017-05-09

## 2017-05-08 RX ORDER — ROCURONIUM BROMIDE 10 MG/ML
INJECTION, SOLUTION INTRAVENOUS
Status: DISCONTINUED | OUTPATIENT
Start: 2017-05-08 | End: 2017-05-08

## 2017-05-08 RX ORDER — AMOXICILLIN 250 MG
1 CAPSULE ORAL 2 TIMES DAILY
Status: DISCONTINUED | OUTPATIENT
Start: 2017-05-08 | End: 2017-05-13

## 2017-05-08 RX ORDER — MUPIROCIN 20 MG/G
1 OINTMENT TOPICAL 2 TIMES DAILY
Status: COMPLETED | OUTPATIENT
Start: 2017-05-08 | End: 2017-05-11

## 2017-05-08 RX ORDER — OXYCODONE HYDROCHLORIDE 5 MG/1
5 TABLET ORAL EVERY 4 HOURS PRN
Status: DISCONTINUED | OUTPATIENT
Start: 2017-05-08 | End: 2017-05-09

## 2017-05-08 RX ORDER — DEXTROSE MONOHYDRATE, SODIUM CHLORIDE, AND POTASSIUM CHLORIDE 50; 1.49; 4.5 G/1000ML; G/1000ML; G/1000ML
INJECTION, SOLUTION INTRAVENOUS CONTINUOUS
Status: DISCONTINUED | OUTPATIENT
Start: 2017-05-08 | End: 2017-05-11

## 2017-05-08 RX ORDER — ALBUMIN HUMAN 50 G/1000ML
25 SOLUTION INTRAVENOUS ONCE
Status: COMPLETED | OUTPATIENT
Start: 2017-05-08 | End: 2017-05-08

## 2017-05-08 RX ORDER — LIDOCAINE HYDROCHLORIDE 10 MG/ML
1 INJECTION, SOLUTION EPIDURAL; INFILTRATION; INTRACAUDAL; PERINEURAL ONCE
Status: COMPLETED | OUTPATIENT
Start: 2017-05-08 | End: 2017-05-08

## 2017-05-08 RX ORDER — ONDANSETRON 2 MG/ML
INJECTION INTRAMUSCULAR; INTRAVENOUS
Status: DISPENSED
Start: 2017-05-08 | End: 2017-05-09

## 2017-05-08 RX ORDER — LIDOCAINE HCL/PF 100 MG/5ML
SYRINGE (ML) INTRAVENOUS
Status: DISCONTINUED | OUTPATIENT
Start: 2017-05-08 | End: 2017-05-08

## 2017-05-08 RX ORDER — NICARDIPINE HYDROCHLORIDE 0.2 MG/ML
INJECTION INTRAVENOUS CONTINUOUS PRN
Status: DISCONTINUED | OUTPATIENT
Start: 2017-05-08 | End: 2017-05-08

## 2017-05-08 RX ORDER — ALBUMIN HUMAN 50 G/1000ML
SOLUTION INTRAVENOUS
Status: COMPLETED
Start: 2017-05-08 | End: 2017-05-08

## 2017-05-08 RX ORDER — ACETAMINOPHEN 10 MG/ML
INJECTION, SOLUTION INTRAVENOUS
Status: DISCONTINUED | OUTPATIENT
Start: 2017-05-08 | End: 2017-05-08

## 2017-05-08 RX ORDER — MAGNESIUM SULFATE HEPTAHYDRATE 40 MG/ML
2 INJECTION, SOLUTION INTRAVENOUS
Status: DISCONTINUED | OUTPATIENT
Start: 2017-05-08 | End: 2017-05-10

## 2017-05-08 RX ORDER — AMINOCAPROIC ACID 250 MG/ML
INJECTION, SOLUTION INTRAVENOUS
Status: DISCONTINUED | OUTPATIENT
Start: 2017-05-08 | End: 2017-05-08

## 2017-05-08 RX ORDER — POTASSIUM CHLORIDE 29.8 MG/ML
40 INJECTION INTRAVENOUS
Status: DISCONTINUED | OUTPATIENT
Start: 2017-05-08 | End: 2017-05-10

## 2017-05-08 RX ORDER — NOREPINEPHRINE BITARTRATE/D5W 4MG/250ML
0.02 PLASTIC BAG, INJECTION (ML) INTRAVENOUS CONTINUOUS
Status: DISCONTINUED | OUTPATIENT
Start: 2017-05-08 | End: 2017-05-09

## 2017-05-08 RX ORDER — ONDANSETRON 8 MG/1
8 TABLET, ORALLY DISINTEGRATING ORAL EVERY 8 HOURS PRN
Status: DISCONTINUED | OUTPATIENT
Start: 2017-05-08 | End: 2017-05-08

## 2017-05-08 RX ORDER — ETOMIDATE 2 MG/ML
INJECTION INTRAVENOUS
Status: DISCONTINUED | OUTPATIENT
Start: 2017-05-08 | End: 2017-05-08

## 2017-05-08 RX ORDER — ONDANSETRON 2 MG/ML
4 INJECTION INTRAMUSCULAR; INTRAVENOUS EVERY 8 HOURS PRN
Status: DISCONTINUED | OUTPATIENT
Start: 2017-05-08 | End: 2017-05-13

## 2017-05-08 RX ORDER — HEPARIN SODIUM 5000 [USP'U]/ML
INJECTION, SOLUTION INTRAVENOUS; SUBCUTANEOUS
Status: DISCONTINUED | OUTPATIENT
Start: 2017-05-08 | End: 2017-05-08

## 2017-05-08 RX ORDER — BACITRACIN 50000 [IU]/1
INJECTION, POWDER, FOR SOLUTION INTRAMUSCULAR
Status: DISCONTINUED | OUTPATIENT
Start: 2017-05-08 | End: 2017-05-08 | Stop reason: HOSPADM

## 2017-05-08 RX ORDER — ALBUMIN HUMAN 50 G/1000ML
12.5 SOLUTION INTRAVENOUS ONCE
Status: COMPLETED | OUTPATIENT
Start: 2017-05-08 | End: 2017-05-08

## 2017-05-08 RX ORDER — PROPOFOL 10 MG/ML
VIAL (ML) INTRAVENOUS CONTINUOUS PRN
Status: DISCONTINUED | OUTPATIENT
Start: 2017-05-08 | End: 2017-05-08

## 2017-05-08 RX ORDER — POTASSIUM CHLORIDE 14.9 MG/ML
20 INJECTION INTRAVENOUS
Status: DISCONTINUED | OUTPATIENT
Start: 2017-05-08 | End: 2017-05-10

## 2017-05-08 RX ORDER — ASPIRIN 325 MG
325 TABLET ORAL DAILY
Status: DISCONTINUED | OUTPATIENT
Start: 2017-05-08 | End: 2017-05-09

## 2017-05-08 RX ORDER — ACETAMINOPHEN 325 MG/1
650 TABLET ORAL EVERY 6 HOURS PRN
Status: DISCONTINUED | OUTPATIENT
Start: 2017-05-08 | End: 2017-05-13

## 2017-05-08 RX ORDER — POTASSIUM CHLORIDE 14.9 MG/ML
40 INJECTION INTRAVENOUS
Status: DISCONTINUED | OUTPATIENT
Start: 2017-05-08 | End: 2017-05-10

## 2017-05-08 RX ORDER — PROTAMINE SULFATE 10 MG/ML
INJECTION, SOLUTION INTRAVENOUS
Status: DISCONTINUED | OUTPATIENT
Start: 2017-05-08 | End: 2017-05-08

## 2017-05-08 RX ADMIN — FENTANYL CITRATE 50 MCG: 50 INJECTION, SOLUTION INTRAMUSCULAR; INTRAVENOUS at 12:05

## 2017-05-08 RX ADMIN — AMINOCAPROIC ACID 10 G: 250 INJECTION, SOLUTION INTRAVENOUS at 07:05

## 2017-05-08 RX ADMIN — FENTANYL CITRATE 100 MCG: 50 INJECTION, SOLUTION INTRAMUSCULAR; INTRAVENOUS at 07:05

## 2017-05-08 RX ADMIN — FENTANYL CITRATE 150 MCG: 50 INJECTION, SOLUTION INTRAMUSCULAR; INTRAVENOUS at 09:05

## 2017-05-08 RX ADMIN — ASPIRIN 325 MG ORAL TABLET 325 MG: 325 PILL ORAL at 04:05

## 2017-05-08 RX ADMIN — NITROGLYCERIN 50 MCG: 5 INJECTION, SOLUTION INTRAVENOUS at 12:05

## 2017-05-08 RX ADMIN — MORPHINE SULFATE 2 MG: 2 INJECTION, SOLUTION INTRAMUSCULAR; INTRAVENOUS at 10:05

## 2017-05-08 RX ADMIN — CALCIUM GLUCONATE 1 G: 94 INJECTION, SOLUTION INTRAVENOUS at 12:05

## 2017-05-08 RX ADMIN — FENTANYL CITRATE 100 MCG: 50 INJECTION, SOLUTION INTRAMUSCULAR; INTRAVENOUS at 12:05

## 2017-05-08 RX ADMIN — DEXTROSE MONOHYDRATE, SODIUM CHLORIDE, AND POTASSIUM CHLORIDE: 50; 4.5; 1.49 INJECTION, SOLUTION INTRAVENOUS at 03:05

## 2017-05-08 RX ADMIN — PROTAMINE SULFATE 160 MG: 10 INJECTION, SOLUTION INTRAVENOUS at 12:05

## 2017-05-08 RX ADMIN — ETOMIDATE 22 MG: 2 INJECTION, SOLUTION INTRAVENOUS at 07:05

## 2017-05-08 RX ADMIN — NOREPINEPHRINE BITARTRATE 0.01 MG: 1 INJECTION, SOLUTION, CONCENTRATE INTRAVENOUS at 09:05

## 2017-05-08 RX ADMIN — CALCIUM GLUCONATE 1000 MG: 94 INJECTION, SOLUTION INTRAVENOUS at 04:05

## 2017-05-08 RX ADMIN — PROPOFOL 25 MCG/KG/MIN: 10 INJECTION, EMULSION INTRAVENOUS at 01:05

## 2017-05-08 RX ADMIN — ROCURONIUM BROMIDE 50 MG: 10 INJECTION, SOLUTION INTRAVENOUS at 07:05

## 2017-05-08 RX ADMIN — BACITRACIN 50000 UNITS: 50000 INJECTION, POWDER, LYOPHILIZED, FOR SOLUTION INTRAMUSCULAR at 12:05

## 2017-05-08 RX ADMIN — MORPHINE SULFATE 2 MG: 2 INJECTION, SOLUTION INTRAMUSCULAR; INTRAVENOUS at 04:05

## 2017-05-08 RX ADMIN — EPINEPHRINE 0.02 MCG/KG/MIN: 1 INJECTION INTRAMUSCULAR; INTRAVENOUS; SUBCUTANEOUS at 12:05

## 2017-05-08 RX ADMIN — FENTANYL CITRATE 100 MCG: 50 INJECTION, SOLUTION INTRAMUSCULAR; INTRAVENOUS at 09:05

## 2017-05-08 RX ADMIN — SODIUM CHLORIDE, SODIUM GLUCONATE, SODIUM ACETATE, POTASSIUM CHLORIDE, MAGNESIUM CHLORIDE, SODIUM PHOSPHATE, DIBASIC, AND POTASSIUM PHOSPHATE: .53; .5; .37; .037; .03; .012; .00082 INJECTION, SOLUTION INTRAVENOUS at 07:05

## 2017-05-08 RX ADMIN — DEXTROSE 2 G: 50 INJECTION, SOLUTION INTRAVENOUS at 06:05

## 2017-05-08 RX ADMIN — NICARDIPINE HYDROCHLORIDE 5 MG/HR: 0.2 INJECTION, SOLUTION INTRAVENOUS at 12:05

## 2017-05-08 RX ADMIN — OXYCODONE HYDROCHLORIDE 5 MG: 5 TABLET ORAL at 06:05

## 2017-05-08 RX ADMIN — MIDAZOLAM HYDROCHLORIDE 2 MG: 1 INJECTION, SOLUTION INTRAMUSCULAR; INTRAVENOUS at 06:05

## 2017-05-08 RX ADMIN — Medication 500 MG: at 08:05

## 2017-05-08 RX ADMIN — ACETAMINOPHEN 1000 MG: 10 INJECTION, SOLUTION INTRAVENOUS at 01:05

## 2017-05-08 RX ADMIN — ONDANSETRON 4 MG: 2 INJECTION INTRAMUSCULAR; INTRAVENOUS at 05:05

## 2017-05-08 RX ADMIN — NICARDIPINE HYDROCHLORIDE 2.5 MG/HR: 0.2 INJECTION, SOLUTION INTRAVENOUS at 03:05

## 2017-05-08 RX ADMIN — GELATIN ABSORBABLE SPONGE SIZE 100 1 APPLICATOR: MISC at 09:05

## 2017-05-08 RX ADMIN — ALBUMIN (HUMAN) 25 G: 12.5 SOLUTION INTRAVENOUS at 02:05

## 2017-05-08 RX ADMIN — POTASSIUM CHLORIDE 20 MEQ: 200 INJECTION, SOLUTION INTRAVENOUS at 03:05

## 2017-05-08 RX ADMIN — MIDAZOLAM HYDROCHLORIDE 3 MG: 1 INJECTION, SOLUTION INTRAMUSCULAR; INTRAVENOUS at 01:05

## 2017-05-08 RX ADMIN — NOREPINEPHRINE BITARTRATE 0 MG: 1 INJECTION, SOLUTION, CONCENTRATE INTRAVENOUS at 09:05

## 2017-05-08 RX ADMIN — ONDANSETRON 4 MG: 2 INJECTION INTRAMUSCULAR; INTRAVENOUS at 03:05

## 2017-05-08 RX ADMIN — MUPIROCIN 1 G: 20 OINTMENT TOPICAL at 10:05

## 2017-05-08 RX ADMIN — AMINOCAPROIC ACID 1 G/HR: 250 INJECTION, SOLUTION INTRAVENOUS at 07:05

## 2017-05-08 RX ADMIN — NOREPINEPHRINE BITARTRATE 0.04 MCG/KG/MIN: 1 INJECTION, SOLUTION, CONCENTRATE INTRAVENOUS at 12:05

## 2017-05-08 RX ADMIN — POTASSIUM CHLORIDE 20 MEQ: 200 INJECTION, SOLUTION INTRAVENOUS at 08:05

## 2017-05-08 RX ADMIN — LIDOCAINE HYDROCHLORIDE 100 MG: 20 INJECTION, SOLUTION INTRAVENOUS at 12:05

## 2017-05-08 RX ADMIN — ALBUMIN (HUMAN) 12.5 G: 12.5 SOLUTION INTRAVENOUS at 04:05

## 2017-05-08 RX ADMIN — ALBUMIN HUMAN 25 G: 50 SOLUTION INTRAVENOUS at 02:05

## 2017-05-08 RX ADMIN — DEXTROSE 2 G: 50 INJECTION, SOLUTION INTRAVENOUS at 09:05

## 2017-05-08 RX ADMIN — LIDOCAINE HYDROCHLORIDE 10 MG: 10 INJECTION, SOLUTION EPIDURAL; INFILTRATION; INTRACAUDAL; PERINEURAL at 05:05

## 2017-05-08 RX ADMIN — HEPARIN SODIUM 15000 UNITS: 5000 INJECTION, SOLUTION INTRAVENOUS; SUBCUTANEOUS at 09:05

## 2017-05-08 NOTE — OP NOTE
DATE OF PROCEDURE:  05/08/2017.    ATTENDING SURGEON:  Farhat Ortega M.D.    PREOPERATIVE DIAGNOSES:  1.  Rheumatic heart disease.  2.  Mixed mitral stenosis and mitral regurgitation.  3.  Mixed aortic stenosis and aortic insufficiency.  4.  Class III heart failure.  5.  Syncope.  6.  Angina.    POSTOPERATIVE DIAGNOSES:  1.  Rheumatic heart disease.  2.  Mixed mitral stenosis and mitral regurgitation.  3.  Mixed aortic stenosis and aortic insufficiency.  4.  Class III heart failure.  5.  Syncope.  6.  Angina.    OPERATIONS PERFORMED:  1.  Aortic valve replacement with a 20 mm Medtronic ATS open pivot mechanical   prosthesis.  2.  Mitral valve replacement (posterior leaflet sparing) with a 29 mm St. Wu Masters series mechanical   prosthesis.    ANESTHESIA:  General endotracheal.    ESTIMATED BLOOD LOSS:  100 mL.    BRIEF HISTORY:  Ms. Ibarra is a 42-year-old woman who I had initially seen   roughly one year ago.  At that time, she was minimally symptomatic.    Unfortunately, she has had progressive dyspnea on exertion and exercise   intolerance.  She was forced to quit work four months ago.  She underwent a   thoughtful and thorough evaluation, which included echocardiography.  This study   demonstrated mixed aortic stenosis and aortic insufficiency, as well as mitral   stenosis and mitral regurgitation.  She now presents for aortic and mitral valve   replacement.    PROCEDURE IN DETAIL:  After obtaining informed and written consent, the patient   was brought to the Operating Room and placed on the operating table in supine   position.  After induction of adequate general endotracheal anesthesia, the   patient's chest, abdomen, pelvis, and bilateral lower extremities were prepped   and draped in the usual sterile fashion.  An upper midline skin incision was   made, and a median sternotomy was performed.  A pericardial well was created.    The patient was systemically heparinized.  Cannulation sutures were  placed in   the aorta and in the superior vena cava.  The aortic cannula was inserted,   followed by the venous.  An IVC cannula was also placed.  Antegrade and   retrograde cardioplegia catheters were placed.  The patient was then put on   cardiopulmonary bypass.  Once on bypass, the aorta was  from the   pulmonary artery.  Circumferential control was obtained over the superior vena   cava.  The aortic crossclamp was applied, and the heart was arrested using cold   blood-enhanced antegrade cardioplegia.  A prompt electromechanical arrest was   achieved.  Given her aortic insufficiency, once the heart was arrested, we   completed the cardioplegia using retrograde cardioplegia.  Once the cardioplegia   was all in, a left atriotomy was made near the right-sided pulmonary veins.    The Amelia retractor was placed for exposure, and the mitral valve was   evaluated.  It was a typically rheumatic valve, with fusion of the commissures   and leaflets, which were thickened and retracted.  The subvalvular apparatus was   not significantly involved.  The anterior leaflet was partially resected and   used for exposure as multiple pledgeted 2-0 Ethibond sutures were placed along   the anterior annulus.  Eventually, the anterior leaflet was resected in its   entirety.  The P2 scallop of the posterior leaflet was preserved as multiple   pledgeted 2-0 Ethibond sutures were placed along the remainder of the mitral   annulus.  Once the sutures were all in, the annulus was sized and a 29 mm valve   was selected.  The valve was brought up, the sutures were passed through the   sewing ring, and it was slid into position.  It seated nicely.  The sutures were   tied and then cut.  The left atriotomy was then closed in a single layer using   running 4-0 Prolene suture.  Prior to closing the left atrium, de-airing   maneuvers were performed.  The cannulas were repositioned, and we turned our   attention to the aortic root where  an oblique aortotomy was made.  The valve   exposure sutures were placed, and the aortic valve was visualized.  It too was   typically rheumatic, with leaflets, which were thickened and retracted.  There   was fusion between the left and noncoronary cusps at the commissure.  The   leaflets were cut out.  Multiple nonpledgeted 2-0 Ethibond sutures were placed   circumferentially around the annulus.  The annulus was sized, and a 20 mm valve   was selected.  The valve was brought up, the sutures were passed through the   sewing ring, and it was slid into position.  It seated nicely.  The sutures were   tied and then cut.  The aortotomy was then closed in two layers using running   4-0 Prolene suture.  Once the aortotomy was closed, the hot shot was given   retrograde.  De-airing maneuvers were performed, and the aortic crossclamp was   removed.  The patient was subsequently weaned from cardiopulmonary bypass.  The   patient did separate easily from bypass.  Once off bypass, all surgical sites   were inspected.  There was good hemostasis.  Both valves were evaluated using   BRYANT, and appeared to be functioning properly.  The test dose of protamine was   administered, and this was well tolerated.  The total dose was then given.    MCFP through the total dose, all cannulas were removed.  All surgical sites   were again inspected, and again, there was good hemostasis.  Ventricular pacing   wires were placed.  Two drains were placed in the mediastinum.  After again   confirming adequate hemostasis, the patient's chest was closed using eight #6   stainless steel wires to reapproximate the sternum.  The overlying soft tissues   were reapproximated using absorbable suture material.  The patient's chest was   washed and dried, and a dry dressing was applied.  The patient tolerated the   procedure well, there were no complications.  At the conclusion of the case,   sponge and instrument counts were correct.      PP/HN  dd:  05/08/2017 13:22:23 (CDT)  td: 05/08/2017 17:29:33 (RENUKAT)  Doc ID   #[id number]  Job ID #[id number]    CC:

## 2017-05-08 NOTE — INTERVAL H&P NOTE
The patient has been examined and the H&P has been reviewed:    I concur with the findings and no changes have occurred since H&P was written.    Anesthesia/Surgery risks, benefits and alternative options discussed and understood by patient/family.          Active Hospital Problems    Diagnosis  POA    Aortic stenosis [I35.0]  Yes      Resolved Hospital Problems    Diagnosis Date Resolved POA   No resolved problems to display.

## 2017-05-08 NOTE — CONSULTS
[facility]  Endocrinology  Diabetes Consult Note    Consult Requested by: Farhat Ortega MD   Reason for admit: S/P AVR (aortic valve replacement)    HISTORY OF PRESENT ILLNESS:  Reason for Consult: Management of  Hyperglycemia     Surgical Procedure and Date: 5/8/2017  1) REPLACEMENT-VALVE-AORTIC   2) REPLACEMENT-VALVE-MITRAL      HPI:   Patient is a 42 y.o. 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 + CURIEL that has forced her to quit working 4 months ago. + syncope (which is new) + angina (also new). Now s/p AVR and MVR.   No personal or family hx of diabetes. Admission A1c is 5.3%.   Endocrine consulted for BG Management.         Intubated in ICU. CTS insulin infusion started in ICU for BG reading of 162 with hourly BG monitoring.   Eating:   NPO  Nausea: Intubated   Hypoglycemia and intervention: No  Fever: No  TPN and/or TF: No    PMH, PSH, FH, SH updated and reviewed     ROS:  Unable to obtain due to: Intubation,Reviewed ROS from note dated 5/4/17 per Dr. MONSTER Ortega     Review of Systems    Current Medications and/or Treatments Impacting Glycemic Control  Immunotherapy:    Immunosuppressants     None        Steroids:   Hormones     None        Pressors:    Autonomic Drugs     Start     Stop Route Frequency Ordered    05/08/17 1445  epinephrine 4 mg in sodium chloride 0.9% 250 mL infusion     Question Answer Comment   Titrate by: (in mcg/kg/min) 0.05    Titrate interval: (in minutes) 5    Titrate to maintain: (SBP or MAP or Cardiac Index) SBP    Maximum dose: (in mcg/kg/min) 2        -- IV Continuous 05/08/17 1353    05/08/17 1445  norepinephrine 4 mg in dextrose 5% 250 mL infusion (premix) (titrating)     Question Answer Comment   Titrate by: (in mcg/kg/min) 0.05    Titrate interval: (in minutes) 5    Titrate to maintain: (MAP or SBP) MAP    Greater than: (in mmHg) 65    Maximum dose: (in mcg/kg/min) 3        -- IV Continuous  05/08/17 1353        Hyperglycemia/Diabetes Medications:   Antihyperglycemics     Start     Stop Route Frequency Ordered    05/08/17 1445  insulin regular (Humulin R) 100 Units in sodium chloride 0.9% 100 mL infusion      -- IV Continuous 05/08/17 1353             PHYSICAL EXAMINATION:  Vitals:    05/08/17 1715   BP:    Pulse: 86   Resp: 19   Temp:      Body mass index is 27.98 kg/(m^2).    Physical Exam     PHYSICAL EXAMINATION  Constitutional:  Well developed, well nourished, NAD.  ENT: External ears no masses with nose patent  Neck:  Supple; trachea midline  Cardiovascular: Mechanical click, no LE edema. + 2 DP pulses bilaterally.     Lungs:  Intubated and mechanically ventilated; lungs anterior bilaterally clear to auscultation. Chest tubes with sanguineous drainage.   Abdomen:  Soft, no masses,  no hernias. Hypoactive bowel sounds.   MS: No clubbing or cyanosis of nails noted; unable to assess gait.  Skin: No rashes, lesions, or ulcers; no nodules. Sternal incision dressing with small amount of sanguineous drainage.   Psychiatric: MARCIO, intubated   Neurological: MARCIO, intubated         Labs Reviewed and Include     Recent Labs  Lab 05/08/17  1350   *   CALCIUM 7.7*      K 3.5  3.5   CO2 24   *   BUN 9   CREATININE 0.8     Lab Results   Component Value Date    WBC 17.36 (H) 05/08/2017    HGB 11.2 (L) 05/08/2017    HCT 31 (L) 05/08/2017    MCV 89 05/08/2017     (L) 05/08/2017     No results for input(s): TSH, FREET4 in the last 168 hours.  Lab Results   Component Value Date    HGBA1C 5.3 05/04/2017       Nutritional status:   Body mass index is 27.98 kg/(m^2).  Lab Results   Component Value Date    ALBUMIN 4.4 05/04/2017    ALBUMIN 4.6 03/07/2016    ALBUMIN 4.1 09/03/2015     No results found for: PREALBUMIN    Estimated Creatinine Clearance: 82.7 mL/min (based on Cr of 0.8).    Accu-Checks  No results for input(s): POCTGLUCOSE in the last 72 hours.     ASSESSMENT and PLAN    Stress  hyperglycemia  BG goal 110-140.   Continue CTS insulin infusion with hourly BG monitoring    Discharge Planning: Anticipating resolution.       Rheumatic aortic stenosis  Now s/p AVR       Mitral valve stenosis, rheumatic  Now s/p MVR     * S/P AVR (aortic valve replacement)  Managed per CTS.       S/P MVR (mitral valve replacement)  Managed per CTS.       History of MI (myocardial infarction)  Avoid hypoglycemia.         Plan discussed with family and RN at bedside.     Annabel Brandon NP  Endocrinology  [facility]

## 2017-05-08 NOTE — PLAN OF CARE
Problem: Patient Care Overview  Goal: Plan of Care Review  Outcome: Ongoing (interventions implemented as appropriate)  Patient with stable vital signs. Patient on ventilator with o2sats 97%. Patient arouses to voice, follows commands. Patient with daughter at bedside. Patient and daughter updated on plan of care for the day per RN, and Dr. Ortega. Dr. Llamas updated on patient's assessment. CVP, urine output, and vitals. Will continue to monitor patient.

## 2017-05-08 NOTE — SUBJECTIVE & OBJECTIVE
Intubated in ICU. CTS insulin infusion started in ICU for BG reading of 162 with hourly BG monitoring.   Eating:   NPO  Nausea: Intubated   Hypoglycemia and intervention: No  Fever: No  TPN and/or TF: No    PMH, PSH, FH, SH updated and reviewed     ROS:  Unable to obtain due to: Intubation,Reviewed ROS from note dated 5/4/17 per Dr. MONSTER Ortega     Review of Systems    Current Medications and/or Treatments Impacting Glycemic Control  Immunotherapy:    Immunosuppressants     None        Steroids:   Hormones     None        Pressors:    Autonomic Drugs     Start     Stop Route Frequency Ordered    05/08/17 1445  epinephrine 4 mg in sodium chloride 0.9% 250 mL infusion     Question Answer Comment   Titrate by: (in mcg/kg/min) 0.05    Titrate interval: (in minutes) 5    Titrate to maintain: (SBP or MAP or Cardiac Index) SBP    Maximum dose: (in mcg/kg/min) 2        -- IV Continuous 05/08/17 1353    05/08/17 1445  norepinephrine 4 mg in dextrose 5% 250 mL infusion (premix) (titrating)     Question Answer Comment   Titrate by: (in mcg/kg/min) 0.05    Titrate interval: (in minutes) 5    Titrate to maintain: (MAP or SBP) MAP    Greater than: (in mmHg) 65    Maximum dose: (in mcg/kg/min) 3        -- IV Continuous 05/08/17 1353        Hyperglycemia/Diabetes Medications:   Antihyperglycemics     Start     Stop Route Frequency Ordered    05/08/17 1445  insulin regular (Humulin R) 100 Units in sodium chloride 0.9% 100 mL infusion      -- IV Continuous 05/08/17 1353             PHYSICAL EXAMINATION:  Vitals:    05/08/17 1715   BP:    Pulse: 86   Resp: 19   Temp:      Body mass index is 27.98 kg/(m^2).    Physical Exam     PHYSICAL EXAMINATION  Constitutional:  Well developed, well nourished, NAD.  ENT: External ears no masses with nose patent  Neck:  Supple; trachea midline  Cardiovascular: Mechanical click, no LE edema. + 2 DP pulses bilaterally.     Lungs:  Intubated and mechanically ventilated; lungs anterior bilaterally  clear to auscultation. Chest tubes with sanguineous drainage.   Abdomen:  Soft, no masses,  no hernias. Hypoactive bowel sounds.   MS: No clubbing or cyanosis of nails noted; unable to assess gait.  Skin: No rashes, lesions, or ulcers; no nodules. Sternal incision dressing with small amount of sanguineous drainage.   Psychiatric: MARCIO, intubated   Neurological: MARCIO, intubated

## 2017-05-08 NOTE — PROGRESS NOTES
Pt arrived from OR and placed on vent with settings charted. Pt resting comfortably at this time. Will continue to monitor.

## 2017-05-08 NOTE — TRANSFER OF CARE
"Anesthesia Transfer of Care Note    Patient: Rea Ibarra    Procedure(s) Performed: Procedure(s) (LRB):  REPLACEMENT-VALVE-AORTIC (N/A)  REPLACEMENT-VALVE-MITRAL (N/A)    Patient location: ICU    Anesthesia Type: general    Post pain: adequate analgesia    Post assessment: no apparent anesthetic complications    Level of consciousness: sedated    Nausea/Vomiting: no nausea/vomiting    Complications: none    Transfer of care protocol was followed      Last vitals:   Visit Vitals    /78 (BP Location: Right arm, Patient Position: Lying, BP Method: Automatic)    Pulse 65    Temp 36.7 °C (98.1 °F)    Resp 16    Ht 5' 2" (1.575 m)    Wt 69.4 kg (153 lb)    LMP  (Within Months)    SpO2 98%    Breastfeeding No    BMI 27.98 kg/m2     "

## 2017-05-08 NOTE — BRIEF OP NOTE
[facility]  Brief Operative Note    SUMMARY     Surgery Date: 5/8/2017     Surgeon(s) and Role:     * Keanu Llamas MD - Resident - Assisting     * Farhat Ortega MD - Primary        Pre-op Diagnosis:  Rheumatic aortic stenosis [I06.0]  Mitral valve stenosis, rheumatic [I05.0]    Post-op Diagnosis:  Post-Op Diagnosis Codes:     * Rheumatic aortic stenosis [I06.0]     * Mitral valve stenosis, rheumatic [I05.0]    Procedure(s) (LRB):  1)  REPLACEMENT-VALVE-AORTIC with a 20 mm Medtronic ATS open pivot mechanical prosthesis  68167  2)  REPLACEMENT-VALVE-MITRAL (posterior leaflet sparing) with a 29 mm St. Wu Master's mechanical prosthesis  [id number]    Anesthesia: General        Description of the findings of the procedure: Typical rheumatic aortic and mitral valves.    Estimated Blood Loss: 100 mL         Specimens:   Specimen (12h ago through future)    Start     Ordered    05/08/17 1310  Specimen to Pathology - Surgery  Once     Comments:  1.  Anterior leaflets and capillary muscle (mitral valve) for permanent to refrigerator  2.  Aortic Valve leaflets for permanent to refrigerator    05/08/17 1309    05/08/17 1126  Specimen to Pathology - Surgery  Once     Comments:  1.  Anterior leaflets and capillary muscle for permanent to refrigerator  2.  Aortic Valve leaflets for permanent to refrigerator    05/08/17 1128        Attestation:  I was present and scrubbed for the entire procedure.

## 2017-05-08 NOTE — IP AVS SNAPSHOT
[facility]  [address]  [facility] [address]  Phone: [phone]           Patient Discharge Instructions   Our goal is to set you up for success. This packet includes information on your condition, medications, and your home care.  It will help you care for yourself to prevent having to return to the hospital.     Please ask your nurse if you have any questions.      There are many details to remember when preparing to leave the hospital. Here is what you will need to do:    1. Take your medicine. If you are prescribed medications, review your Medication List on the following pages. You may have new medications to  at the pharmacy and others that you'll need to stop taking. Review the instructions for how and when to take your medications. Talk with your doctor or nurses if you are unsure of what to do.     2. Go to your follow-up appointments. Specific follow-up information is listed in the following pages. Your may be contacted by a nurse or clinical provider about future appointments. Be sure we have all of the phone numbers to reach you. Please contact your provider's office if you are unable to make an appointment.     3. Watch for warning signs. Your doctor or nurse will give you detailed warning signs to watch for and when to call for assistance. These instructions may also include educational information about your condition. If you experience any of warning signs to your health, call your doctor.           [facility]  Unless otherwise directed by your provider, please   contact [facility], our nurse care line   that is available for 24/7 assistance.     [phone] (toll-free)     Registered nurses in the [facility]   provide: appointment scheduling, clinical advisement, health education, and other advisory services.                  ** Verify the list of medication(s) below is accurate and up to date. Carry this with you in case of  emergency. If your medications have changed, please notify your healthcare provider.             Medication List      START taking these medications        Additional Info    Begin Date AM Noon PM Bedtime    amiodarone 400 MG tablet   Commonly known as:  PACERONE   Quantity:  60 tablet   Refills:  0   Dose:  400 mg    Last time this was given:  400 mg on 5/13/2017  8:26 AM   Instructions:  Take 1 tablet (400 mg total) by mouth 2 (two) times daily.                                  ascorbic acid (vitamin C) 500 MG tablet   Commonly known as:  VITAMIN C   Refills:  0   Dose:  500 mg    Last time this was given:  500 mg on 5/13/2017  8:27 AM   Instructions:  Take 1 tablet (500 mg total) by mouth 2 (two) times daily.                                  aspirin 81 MG Chew   Refills:  0   Dose:  81 mg    Last time this was given:  81 mg on 5/13/2017  8:27 AM   Instructions:  Take 1 tablet (81 mg total) by mouth once daily.                               docusate sodium 100 MG capsule   Commonly known as:  COLACE   Refills:  0   Dose:  100 mg    Instructions:  Take 1 capsule (100 mg total) by mouth 2 (two) times daily.                                  famotidine 20 MG tablet   Commonly known as:  PEPCID   Quantity:  60 tablet   Refills:  0   Dose:  20 mg    Last time this was given:  20 mg on 5/13/2017  8:26 AM   Instructions:  Take 1 tablet (20 mg total) by mouth 2 (two) times daily.                                  furosemide 20 MG tablet   Commonly known as:  LASIX   Quantity:  60 tablet   Refills:  0   Dose:  20 mg   Comments:  Take one tablet twice daily for 7 days then decrease to one tablet daily    Instructions:  Take 1 tablet (20 mg total) by mouth 2 (two) times daily.                                  omega-3 fatty acids-fish oil 340-1,000 mg Cap   Refills:  0   Dose:  1 capsule    Last time this was given:  1 capsule on 5/13/2017  8:27 AM   Instructions:  Take 1 capsule by mouth once daily.                                potassium, sodium phosphates 280-160-250 mg Pwpk   Commonly known as:  PHOS-NAK   Refills:  0   Dose:  1 packet    Last time this was given:  1 packet on 5/12/2017  9:06 PM   Instructions:  Take 1 packet by mouth 4 (four) times daily with meals and nightly.                                        tramadol 50 mg tablet   Commonly known as:  ULTRAM   Quantity:  60 tablet   Refills:  0   Dose:  50 mg    Last time this was given:  100 mg on 5/10/2017  8:42 AM   Instructions:  Take 1 tablet (50 mg total) by mouth every 6 (six) hours as needed.                            warfarin 3 MG tablet   Commonly known as:  COUMADIN   Quantity:  30 tablet   Refills:  3   Dose:  3 mg    Last time this was given:  3 mg on 5/10/2017  5:03 PM   Instructions:  Take 1 tablet (3 mg total) by mouth Daily.                                 CONTINUE taking these medications        Additional Info    Begin Date AM Noon PM Bedtime    ferrous sulfate 325 (65 FE) MG EC tablet   Refills:  0   Dose:  325 mg    Last time this was given:  325 mg on 5/13/2017  8:27 AM   Instructions:  Take 325 mg by mouth 3 (three) times daily with meals.                                     metoprolol tartrate 25 MG tablet   Commonly known as:  LOPRESSOR   Quantity:  60 tablet   Refills:  4   Dose:  25 mg    Last time this was given:  25 mg on 5/13/2017  8:27 AM   Instructions:  Take 1 tablet (25 mg total) by mouth 2 (two) times daily.                                       Where to Get Your Medications      [facility] [address] [place] [address] CANELO MARSHALL - [address] AT [place] [address] [place] [address]     Phone:  [phone]     amiodarone 400 MG tablet    famotidine 20 MG tablet    furosemide 20 MG tablet    metoprolol tartrate 25 MG tablet    warfarin 3 MG tablet         You can get these medications from any pharmacy     Bring a paper prescription for each of these medications     tramadol 50 mg tablet       You  don't need a prescription for these medications     ascorbic acid (vitamin C) 500 MG tablet    aspirin 81 MG Chew    docusate sodium 100 MG capsule    omega-3 fatty acids-fish oil 340-1,000 mg Cap    potassium, sodium phosphates 280-160-250 mg Pwpk                  Please bring to all follow up appointments:    1. A copy of your discharge instructions.  2. All medicines you are currently taking in their original bottles.  3. Identification and insurance card.    Please arrive 15 minutes ahead of scheduled appointment time.    Please call 24 hours in advance if you must reschedule your appointment and/or time.        Your Scheduled Appointments     Jun 06, 2017 10:15 AM CDT   Diagnostic Xray with NOM XROP3 485 LB LIMIT   [facility] ([facility] )    [address] [facility] [address]   [phone]            Jun 06, 2017 10:45 AM CDT   EKG with EKG, APPT   [facility] - EKG ([facility] )    [address]  [place] [id number] [phone]            Jun 06, 2017 11:15 AM CDT   Post OP with Farhat Ortega MD   [facility] - Cardiovascular Surg ([facility] )    [address]  [place] [id number] [phone]              Follow-up Information     Follow up with Farhat Ortega MD In 3 weeks.    Specialties:  Cardiothoracic Surgery, Transplant    Contact information:    [facility] [facility] [id number] [phone]            Discharge Instructions       Please follow up with Dr. Brown on Monday, May 15, 2017 at 1:50pm.     Discharge References/Attachments     HEART VALVE SURGERY: REPAIR AND REPLACEMENT, DISCHARGE INSTRUCTIONS FOR (Greek)        Primary Diagnosis     Your primary diagnosis was:  Status Post Aortic Valve Replacement      Admission Information     Date & Time Provider Department CSN    5/8/2017  5:09 AM Farhat Ortega MD [facility] [id number]      Care Providers     Provider  "Role Specialty Primary office phone    Farhat Ortega MD Attending Provider Cardiothoracic Surgery [phone]    Faraht Ortega MD Surgeon  Cardiothoracic Surgery [phone]      Your Vitals Were     BP Pulse Temp Resp Height Weight    105/65 (BP Location: Right arm, Patient Position: Sitting, BP Method: Automatic) 61 97.9 °F (36.6 °C) (Oral) 18 5' 2" (1.575 m) 64.7 kg (142 lb 10.2 oz)    Last Period SpO2 BMI          (Within Months) 98% 26.09 kg/m2        Recent Lab Values        7/7/2015 3/7/2016 5/4/2017                     8:36 AM  7:02 AM 11:05 AM         A1C 5.2 5.0 5.3         Comment for A1C at 11:05 AM on 5/4/2017:  According to ADA guidelines, hemoglobin A1C <7.0% represents  optimal control in non-pregnant diabetic patients.  Different  metrics may apply to specific populations.   Standards of Medical Care in Diabetes - 2016.  For the purpose of screening for the presence of diabetes:  <5.7%     Consistent with the absence of diabetes  5.7-6.4%  Consistent with increasing risk for diabetes   (prediabetes)  >or=6.5%  Consistent with diabetes  Currently no consensus exists for use of hemoglobin A1C  for diagnosis of diabetes for children.        Pending Labs     Order Current Status    Specimen to Pathology - Surgery Collected (05/08/17 1128)    Specimen to Pathology - Surgery In process    Prepare Fresh Frozen Plasma 2 Units Preliminary result      Allergies as of 5/13/2017     No Known Allergies      Advance Directives     An advance directive is a document which, in the event you are no longer able to make decisions for yourself, tells your healthcare team what kind of treatment you do or do not want to receive, or who you would like to make those decisions for you.  If you do not currently have an advance directive, [facility] encourages you to create one.  For more information call:  [phone]), [phone] ([phone]),  or log on to [email or web address].      "   Smoking Cessation     If you would like to quit smoking:   You may be eligible for free services if you are a [place] resident and started smoking cigarettes before September 1, 1988.  Call the Smoking Cessation Trust (SCT) toll free at (254) 824-8812 or (595) 819-4394.   Call 1-800-QUIT-NOW if you do not meet the above criteria.   Contact us via email: [email or web address]   View our website for more information: [email or web address]        Language Assistance Services     ATTENTION: Language assistance services are available, free of charge. Please call [phone].      ATENCIÓN: Si habla español, tiene a saldana disposición servicios gratuitos de asistencia lingüística. Llame al [phone].     CHÚ Ý: N?u b?n nói Ti?ng Vi?t, có các d?ch v? h? tr? ngôn ng? mi?n phí dành cho b?n. G?i s? [phone].        Coumadin Discharge Instructions                          [facility] complies with applicable Federal civil rights laws and does not discriminate on the basis of race, color, national origin, age, disability, or sex.

## 2017-05-08 NOTE — H&P (VIEW-ONLY)
History & Physical    SUBJECTIVE:     History of Present Illness:  Patient is a 42 y.o. 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 + CURIEL that has forced her to quit working 4 months ago. + syncope (which is new). + angina (also new). She is here for 1 year follow up -- was recommended to have AVR/MVR, but wanted to wait until this summer since her grandchild was graduating. She reports a h/o of a ? cardiac arrest, unknown etiology. Denies TIA symptoms. Smoked pack/day for a year. No illicit drugs or alcohol use.     NHYA Class III    No LHC in our system    STS score 0.765        Chief Complaint   Patient presents with    Pre-op Exam       Review of patient's allergies indicates:  No Known Allergies    Current Outpatient Prescriptions   Medication Sig Dispense Refill    ferrous sulfate 325 (65 FE) MG EC tablet Take 325 mg by mouth 3 (three) times daily with meals.      metoprolol tartrate (LOPRESSOR) 25 MG tablet Take 25 mg by mouth 2 (two) times daily.       No current facility-administered medications for this visit.        Past Medical History:   Diagnosis Date    Aortic stenosis     Bleeding ulcer     Iron deficiency anemia     Mitral stenosis     Myocardial infarction     about 2 years ago per patient     Past Surgical History:   Procedure Laterality Date    MANDIBLE FRACTURE SURGERY       Family History   Problem Relation Age of Onset    No Known Problems Mother     No Known Problems Father      Social History   Substance Use Topics    Smoking status: Current Every Day Smoker     Packs/day: 0.00     Types: Cigarettes    Smokeless tobacco: None      Comment: 2 to 3 cigarettes per week    Alcohol use No        Review of Systems:  Review of Systems   Constitutional: Positive for activity change and fatigue. Negative for appetite change, chills and fever.   HENT: Negative for congestion and trouble swallowing.    Eyes: Negative for visual disturbance.  "  Respiratory: Positive for shortness of breath. Negative for cough.    Cardiovascular: Positive for chest pain. Negative for leg swelling.   Gastrointestinal: Negative for abdominal distention, abdominal pain, constipation, diarrhea, nausea and vomiting.   Endocrine: Negative for cold intolerance and heat intolerance.   Genitourinary: Negative for difficulty urinating and dysuria.   Musculoskeletal: Negative for arthralgias and back pain.   Skin: Negative for rash and wound.   Neurological: Positive for syncope. Negative for dizziness and headaches.   Psychiatric/Behavioral: Negative for agitation and behavioral problems.       OBJECTIVE:     Vital Signs (Most Recent)  Temp: 98.2 °F (36.8 °C) (05/04/17 1539)  Pulse: (!) 58 (05/04/17 1539)  BP: 133/85 (05/04/17 1539)  SpO2: 100 % (05/04/17 1539)  5' 2" (1.575 m)  69.8 kg (153 lb 12.8 oz)   BMI 25    Physical Exam:  Physical Exam   Constitutional: She is oriented to person, place, and time. She appears well-developed and well-nourished. No distress.   Cardiovascular: Normal rate and regular rhythm.  Exam reveals no gallop and no friction rub.    Murmur heard.  Crescendo murmur appreciated   Pulmonary/Chest: Effort normal and breath sounds normal. No respiratory distress. She has no wheezes. She has no rales.   Abdominal: Soft. Bowel sounds are normal.   Musculoskeletal: Normal range of motion. She exhibits no edema.   Neurological: She is alert and oriented to person, place, and time.   Skin: Skin is warm and dry.   Psychiatric: She has a normal mood and affect. Her behavior is normal.       Laboratory  Reviewed    Diagnostic Results:  R/L heart cath [place] 9/1/2015  -No CAD  Ao 97/71  /9  RAP 5  RVP 45/9  PAP 48/24  PCW 21    TTE OSH 6/2016  EF 60%, nml systolic, abnormal diastolic  Moderate MS  Moderate AS 1.1cm2, mean gradient 21.6  LAD 4.8cm severely enlarged  Moderate AR  PASP 23    ASSESSMENT/PLAN:     Ms Ibarra is a 42 y.o. female with RHD with " moderate aortic and mitral stenosis    PLAN:    - Plan for MVR/AVR -- she desires mechanically valves -- understands need for lifelong coumadin  - Consents obtained      CTS Attending Note:    I have personally taken the history and examined this patient and agree with the resident's note as stated above.  Plan AVR/MVR. Questions answered. She wishes to proceed.

## 2017-05-08 NOTE — ANESTHESIA PROCEDURE NOTES
BRYNAT    Diagnosis: mitral stenois, aortic stenosis, rheumatic disease  Patient location during procedure: OR  Procedure start time: 5/8/2017 8:27 AM  Timeout: 5/8/2017 8:27 AM  Procedure end time: 5/8/2017 8:27 AM  Exam type: Baseline  Staffing  Anesthesiologist: BILL MENDOZA  Performed by: anesthesiologist   Preanesthetic Checklist  Completed: patient identified, surgical consent, pre-op evaluation, timeout performed, risks and benefits discussed, monitors and equipment checked, anesthesia consent given, oxygen available, suction available, hand hygiene performed and patient being monitored  Setup & Induction  Patient preparation: bite block inserted  Probe Insertion: easy  Exam: complete  Exam         LVAD:no  Estimated Ejection Fraction: > 55% normal  Regional Wall Abnormalities: no RWMA            Right Heart  Right Ventricle: normal  Right Ventricle Function: normal    Intra Atrial Septum  PFO: no shunt by color flow doppler  no IAS aneurysm  no lipomatous hypertrophy  no Atrial Septal Defect (Asd)    Right Ventricle  Size: normal, Free Wall Thickness: normal    Aortic Valve:  Stenosis: moderate and mild  Morphology: trileaflet and mild  LVOT: 2 cm   LVOT TVI: 21.3 cm  AV VTI: 52.5 cm  AV Mean Gradient: 16 mmHg, mild (<20)  AV Peak Gradient: 27 mmHg  VILLA by Continuity Equation: 1.1 cm2  VILLA by planimetry: 1.07 cm2  Regurgitation: moderate (3+) aortic regurgitation     Mitral Valve:  Morphology:rheumatic  Prolapse: none  Flail: no flail  Restricted: P1, P2 and P3  Jet Description: mild-to-moderate and centrally-directed  Mitral Stenosis Mean Gradient: 7 mmhg  MVA by Pressure Half-time: 1.47 () ms (220/PHT)moderate (1.0-1.5)    Tricuspid Valve:  Morphology: normal  Regurgitation: mild    Pulmonic Valve:  Morphology:normal  Regurgitation(color flow): none    Great Vessels  Ascending Aorta Atherosclerosis: 1=nl-min dz  Aortic Arch Atherosclerosis: 1=nl-min dz  IABP: no  Descending Aorta Atherosclerosis:  1=nl-min dz  Aorta    Descending aorta IABP: no    Effusions  Effusions: none    Summary  Findings discussed with surgeon.    Other Findings   Normal biventricular function. Severely thickened, restricted mitral leaflets with central MR jet.  Pk/mn 15/7 consistent with moderate steonosis.  Aortic valve leaflets thickened with moderate AI and mild-moderate stenosis. No PFO

## 2017-05-08 NOTE — H&P
History & Physical  Surgical Intensive Care    SUBJECTIVE:     Chief Complaint/Reason for Admission: aortic stenosis    History of Present Illness:  Patient is a 42 y.o. female with tobacco use, likely history of MI, rheumatic HD with with mixed MS/MR and AS/AI, history of ? Cardiac arrest who has CURIEL, syncope and angina. Patient is admitted for AVR and MVR 5/8/17. Patinent transferred to ICU post-op intubated on amicar, epinephrine, Cardene, and insulin.     PTA Medications   Medication Sig    metoprolol tartrate (LOPRESSOR) 25 MG tablet Take 25 mg by mouth 2 (two) times daily.    ferrous sulfate 325 (65 FE) MG EC tablet Take 325 mg by mouth 3 (three) times daily with meals.       Review of patient's allergies indicates:  No Known Allergies    Past Medical History:   Diagnosis Date    Aortic stenosis     Bleeding ulcer     Iron deficiency anemia     Mitral stenosis     Myocardial infarction     about 2 years ago per patient     Past Surgical History:   Procedure Laterality Date    MANDIBLE FRACTURE SURGERY       Family History   Problem Relation Age of Onset    No Known Problems Mother     No Known Problems Father      Social History   Substance Use Topics    Smoking status: Former Smoker     Packs/day: 0.00     Types: Cigarettes    Smokeless tobacco: Former User     Quit date: 4/1/2016      Comment: 2 to 3 cigarettes per week    Alcohol use No        Review of Systems:  Per Chart review, patient intubated  Constitutional: Positive for activity change and fatigue. Negative for appetite change, chills and fever.   HENT: Negative for congestion and trouble swallowing.   Eyes: Negative for visual disturbance.   Respiratory: Positive for shortness of breath. Negative for cough.   Cardiovascular: Positive for chest pain. Negative for leg swelling.   Gastrointestinal: Negative for abdominal distention, abdominal pain, constipation, diarrhea, nausea and vomiting.   Endocrine: Negative for cold intolerance and  heat intolerance.   Genitourinary: Negative for difficulty urinating and dysuria.   Musculoskeletal: Negative for arthralgias and back pain.   Skin: Negative for rash and wound.   Neurological: Positive for syncope. Negative for dizziness and headaches.   Psychiatric/Behavioral: Negative for agitation and behavioral problems.     OBJECTIVE:     Vital Signs (Most Recent)  Temp: 97.9 °F (36.6 °C) (05/08/17 1400)  Pulse: 88 (05/08/17 1400)  Resp: 14 (05/08/17 1400)  BP: 99/68 (05/08/17 1348)  SpO2: 100 % (05/08/17 1400)  Ventilator Data (Last 24H):     Vent Mode: SIMV  Oxygen Concentration (%):  [] 70  Resp Rate Total:  [14 br/min] 14 br/min  Vt Set:  [500 mL] 500 mL  PEEP/CPAP:  [5 cmH20] 5 cmH20  Pressure Support:  [10 cmH20] 10 cmH20  Mean Airway Pressure:  [9.8 cmH20] 9.8 cmH20    Hemodynamic Parameters (Last 24H):       Physical Exam:  General: sedated, intubated, well developed, well nourished  Eyes:  conjunctivae/corneas clear. PERRL.  Throat: limited visualization due to endotracheal tube  Neck: supple, symmetrical, trachea midline, no JVD, thyroid not enlarged, symmetric, no tenderness/mass/nodules and R IJ CVC in place  Lungs:  clear to auscultation bilaterally and mechanical ventilation  Chest Wall: chest tubes in place, dressing clean dry and in tact  Heart: regular rate and rhythm  Abdomen: soft, non-tender non-distented; bowel sounds normal  Extremities: no cyanosis or edema, or clubbing  Neurologic: Sedated    Lines/Drains:       Peripheral IV - Single Lumen 05/08/17 0550 Right Hand (Active)   Site Assessment Clean;Dry;Intact;No redness;No swelling 5/8/2017  6:01 AM   Line Status Blood return noted;Flushed;Infusing 5/8/2017  6:01 AM   Dressing Status Clean;Dry;Intact 5/8/2017  6:01 AM   Dressing Intervention New dressing 5/8/2017  6:01 AM   Number of days:0            Arterial Line 05/08/17 0710 Left Radial (Active)   Number of days:0            Urethral Catheter 05/08/17 0724 Temperature  probe;Straight-tip;Non-latex 16 Fr. (Active)   Output (mL) 150 mL 5/8/2017  2:00 PM   Number of days:0            Y Chest Tube 1 and 2 05/08/17 1239 Mediastinal 19 Fr. Mediastinal 19 Fr. (Active)   Output (mL) 25 mL 5/8/2017  2:00 PM   Number of days:0       Laboratory  CBC:   Recent Labs  Lab 05/08/17  1350 05/08/17  1355   WBC 17.36*  --    RBC 3.79*  --    HGB 11.2*  --    HCT 33.8* 31*   *  --    MCV 89  --    MCH 29.6  --    MCHC 33.1  --      CMP:   Recent Labs  Lab 05/04/17  1105 05/08/17  1350   GLU 88 162*   CALCIUM 10.0 7.7*   ALBUMIN 4.4  --    PROT 7.6  --     144   K 4.1 3.5  3.5   CO2 23 24    112*   BUN 10 9   CREATININE 0.9 0.8   ALKPHOS 62  --    ALT 12  --    AST 17  --    BILITOT 1.0  --      Coagulation:   Recent Labs  Lab 05/08/17  1350   INR 1.3*   APTT 33.8*     Cardiac markers: No results for input(s): CKMB, CPKMB, TROPONINT, TROPONINI, MYOGLOBIN in the last 168 hours.  ABGs:   Recent Labs  Lab 05/08/17  1355   PH 7.419   PCO2 37.8   PO2 165*   HCO3 24.5   POCSATURATED 100   BE 0       Chest X-Ray: Tubes and lines are appropriate.  There is borderline cardiomegaly postoperative change and mild perihilar edema.      ASSESSMENT/PLAN:     41 y/o woman with mixed MS/MR and AS/AI who is s/p AVR and MVR on 5/8.      Plan:  Neuro:   - sedation with propofol gtt  - hold when weaning to extubate  - morphine and oxy prn pain    Pulmonary:   - intubated  - Vent Mode: SIMV  Oxygen Concentration (%):  [] 60  Resp Rate Total:  [14 br/min-16 br/min] 16 br/min  Vt Set:  [500 mL] 500 mL  PEEP/CPAP:  [5 cmH20] 5 cmH20  Pressure Support:  [10 cmH20] 10 cmH20  Mean Airway Pressure:  [9.8 cmH20-10 cmH20] 10 cmH20  - wean to extubate per primary  - duo-nebs prn    Cardiac:  - s/p AVR and MVR   - epinephrine gtt and levophed gtt for MAPs <65  - cardene gtt for MAPs >80  - ASA 325mg  - HDS    Renal:   - BUN/Cr: 9/0.8  - monitor I/O's    Infectious Disease:   - Afebrile, WBC 17  - ancef for  24hrs      Hematology/Oncology:  - H/H 11.2/33.8 down 16/46 preop  - monitor with daily CBC  - Transfuse per primary       Endocrine:  - monitor glucose  - insulin gtt    Fluids/Electrolytes/Nutrition/GI:   - D5 1/2 NS KCl @ 25ml/hr  - NPO  - dietary consulted      Pain Management:   Oxy and morphine prn    Dispo: ICU Care      Carla Grimm MD  [facility], [facility]  SICU Team

## 2017-05-08 NOTE — ANESTHESIA PROCEDURE NOTES
Arterial      Patient location during procedure: done in OR  Procedure start time: 5/8/2017 7:10 AM  Timeout: 5/8/2017 7:10 AM  Procedure end time: 5/8/2017 7:16 AM  Staffing  Anesthesiologist: BILL MENDOZA  Resident/CRNA: ADA MILLS  Performed by: resident/CRNA   Preanesthetic Checklist  Completed: patient identified, site marked, surgical consent, pre-op evaluation, timeout performed, IV checked, risks and benefits discussed, monitors and equipment checked and anesthesia consent given  Arterial Line  Skin Prep: chlorhexidine gluconate  Orientation: left  Location: radial  Catheter Size: 20 G{[facility] ANESTHESIA BLOCK ART PLACEMENTInsertion Attempts: 1  Assessment  Dressing: secured with tape and tegaderm

## 2017-05-08 NOTE — ANESTHESIA PROCEDURE NOTES
Central Line    Diagnosis: Mtrial stenosis  Doctor requesting consult: Shannon  Patient location during procedure: done in OR  Procedure start time: 5/8/2017 7:20 AM  Timeout: 5/8/2017 7:20 AM  Procedure end time: 5/8/2017 7:35 AM  Staffing  Anesthesiologist: BILL MENDOZA  Resident/CRNA: ADA MILLS  Performed by: resident/CRNA   Anesthesiologist was present at the time of the procedure.  Preanesthetic Checklist  Completed: patient identified, site marked, surgical consent, pre-op evaluation, timeout performed, IV checked, risks and benefits discussed, monitors and equipment checked and anesthesia consent given  Indication  Indication: hemodynamic monitoring, vascular access, hemodialysis, med administration     Anesthesia     Central Line  Skin Prep: skin prepped with ChloraPrep, skin prep agent completely dried prior to procedure  maximum sterile barriers used during central venous catheter insertion  hand hygiene performed prior to central venous catheter insertion  Location: right internal jugular,   Catheter Type: MAC.  Catheter Size: 9 Fr  Ultrasound: none   Manometry: Venous cannualation confirmed by visual estimation of blood vessel pressure using manometry.  Insertion Attempts: 1   Securement:line sutured, chlorhexidine patch, sterile dressing applied and blood return through all ports     Post-Procedure

## 2017-05-08 NOTE — ASSESSMENT & PLAN NOTE
BG goal 110-140.   Continue CTS insulin infusion with hourly BG monitoring    Discharge Planning: Anticipating resolution.

## 2017-05-09 LAB
ALBUMIN SERPL BCP-MCNC: 4.1 G/DL
ALLENS TEST: ABNORMAL
ALP SERPL-CCNC: 35 U/L
ALT SERPL W/O P-5'-P-CCNC: 11 U/L
ANION GAP SERPL CALC-SCNC: 7 MMOL/L
ANION GAP SERPL CALC-SCNC: 8 MMOL/L
APTT BLDCRRT: 29.5 SEC
APTT BLDCRRT: 33.7 SEC
APTT BLDCRRT: 41.3 SEC
AST SERPL-CCNC: 48 U/L
BASOPHILS # BLD AUTO: 0.01 K/UL
BASOPHILS NFR BLD: 0.1 %
BILIRUB SERPL-MCNC: 1.1 MG/DL
BLD PROD TYP BPU: NORMAL
BLD PROD TYP BPU: NORMAL
BLOOD UNIT EXPIRATION DATE: NORMAL
BLOOD UNIT EXPIRATION DATE: NORMAL
BLOOD UNIT TYPE CODE: 5100
BLOOD UNIT TYPE CODE: 5100
BLOOD UNIT TYPE: NORMAL
BLOOD UNIT TYPE: NORMAL
BUN SERPL-MCNC: 7 MG/DL
BUN SERPL-MCNC: 7 MG/DL
CALCIUM SERPL-MCNC: 8.3 MG/DL
CALCIUM SERPL-MCNC: 8.7 MG/DL
CHLORIDE SERPL-SCNC: 110 MMOL/L
CHLORIDE SERPL-SCNC: 113 MMOL/L
CO2 SERPL-SCNC: 18 MMOL/L
CO2 SERPL-SCNC: 19 MMOL/L
CODING SYSTEM: NORMAL
CODING SYSTEM: NORMAL
CREAT SERPL-MCNC: 0.7 MG/DL
CREAT SERPL-MCNC: 0.8 MG/DL
DELSYS: ABNORMAL
DIFFERENTIAL METHOD: ABNORMAL
DISPENSE STATUS: NORMAL
DISPENSE STATUS: NORMAL
EOSINOPHIL # BLD AUTO: 0 K/UL
EOSINOPHIL NFR BLD: 0 %
ERYTHROCYTE [DISTWIDTH] IN BLOOD BY AUTOMATED COUNT: 13.1 %
EST. GFR  (AFRICAN AMERICAN): >60 ML/MIN/1.73 M^2
EST. GFR  (AFRICAN AMERICAN): >60 ML/MIN/1.73 M^2
EST. GFR  (NON AFRICAN AMERICAN): >60 ML/MIN/1.73 M^2
EST. GFR  (NON AFRICAN AMERICAN): >60 ML/MIN/1.73 M^2
GLUCOSE SERPL-MCNC: 130 MG/DL
GLUCOSE SERPL-MCNC: 135 MG/DL
HCO3 UR-SCNC: 17 MMOL/L (ref 24–28)
HCT VFR BLD AUTO: 36.5 %
HGB BLD-MCNC: 12.8 G/DL
INR PPP: 1.2
LYMPHOCYTES # BLD AUTO: 0.6 K/UL
LYMPHOCYTES NFR BLD: 3.8 %
MAGNESIUM SERPL-MCNC: 2 MG/DL
MAGNESIUM SERPL-MCNC: 2.2 MG/DL
MCH RBC QN AUTO: 30.3 PG
MCHC RBC AUTO-ENTMCNC: 35.1 %
MCV RBC AUTO: 87 FL
MODE: ABNORMAL
MONOCYTES # BLD AUTO: 1.5 K/UL
MONOCYTES NFR BLD: 10 %
NEUTROPHILS # BLD AUTO: 12.6 K/UL
NEUTROPHILS NFR BLD: 85.8 %
NUM UNITS TRANS FFP: NORMAL
NUM UNITS TRANS FFP: NORMAL
PCO2 BLDA: 28.7 MMHG (ref 35–45)
PEEP: 5
PH SMN: 7.38 [PH] (ref 7.35–7.45)
PHOSPHATE SERPL-MCNC: 2.6 MG/DL
PLATELET # BLD AUTO: 103 K/UL
PMV BLD AUTO: 9.6 FL
PO2 BLDA: 139 MMHG (ref 80–100)
POC BE: -8 MMOL/L
POC SATURATED O2: 99 % (ref 95–100)
POC TCO2: 18 MMOL/L (ref 23–27)
POCT GLUCOSE: 103 MG/DL (ref 70–110)
POCT GLUCOSE: 112 MG/DL (ref 70–110)
POCT GLUCOSE: 114 MG/DL (ref 70–110)
POCT GLUCOSE: 114 MG/DL (ref 70–110)
POCT GLUCOSE: 122 MG/DL (ref 70–110)
POCT GLUCOSE: 124 MG/DL (ref 70–110)
POCT GLUCOSE: 125 MG/DL (ref 70–110)
POCT GLUCOSE: 126 MG/DL (ref 70–110)
POCT GLUCOSE: 127 MG/DL (ref 70–110)
POCT GLUCOSE: 129 MG/DL (ref 70–110)
POCT GLUCOSE: 129 MG/DL (ref 70–110)
POCT GLUCOSE: 130 MG/DL (ref 70–110)
POCT GLUCOSE: 133 MG/DL (ref 70–110)
POCT GLUCOSE: 144 MG/DL (ref 70–110)
POCT GLUCOSE: 153 MG/DL (ref 70–110)
POCT GLUCOSE: 160 MG/DL (ref 70–110)
POTASSIUM SERPL-SCNC: 3.8 MMOL/L
POTASSIUM SERPL-SCNC: 4 MMOL/L
POTASSIUM SERPL-SCNC: 4.2 MMOL/L
PROT SERPL-MCNC: 6.3 G/DL
PROTHROMBIN TIME: 12.2 SEC
RBC # BLD AUTO: 4.22 M/UL
SAMPLE: ABNORMAL
SITE: ABNORMAL
SODIUM SERPL-SCNC: 137 MMOL/L
SODIUM SERPL-SCNC: 138 MMOL/L
SP02: 40
SPONT RATE: 23
WBC # BLD AUTO: 14.65 K/UL

## 2017-05-09 PROCEDURE — 63600175 PHARM REV CODE 636 W HCPCS: Performed by: STUDENT IN AN ORGANIZED HEALTH CARE EDUCATION/TRAINING PROGRAM

## 2017-05-09 PROCEDURE — 99900035 HC TECH TIME PER 15 MIN (STAT)

## 2017-05-09 PROCEDURE — 83735 ASSAY OF MAGNESIUM: CPT | Mod: 91

## 2017-05-09 PROCEDURE — 25000003 PHARM REV CODE 250

## 2017-05-09 PROCEDURE — 94003 VENT MGMT INPAT SUBQ DAY: CPT

## 2017-05-09 PROCEDURE — 99233 SBSQ HOSP IP/OBS HIGH 50: CPT | Mod: ,,, | Performed by: SURGERY

## 2017-05-09 PROCEDURE — 94761 N-INVAS EAR/PLS OXIMETRY MLT: CPT

## 2017-05-09 PROCEDURE — 80053 COMPREHEN METABOLIC PANEL: CPT

## 2017-05-09 PROCEDURE — 84132 ASSAY OF SERUM POTASSIUM: CPT

## 2017-05-09 PROCEDURE — 85025 COMPLETE CBC W/AUTO DIFF WBC: CPT

## 2017-05-09 PROCEDURE — 63600175 PHARM REV CODE 636 W HCPCS

## 2017-05-09 PROCEDURE — 97802 MEDICAL NUTRITION INDIV IN: CPT

## 2017-05-09 PROCEDURE — 37799 UNLISTED PX VASCULAR SURGERY: CPT

## 2017-05-09 PROCEDURE — 20000000 HC ICU ROOM

## 2017-05-09 PROCEDURE — 83735 ASSAY OF MAGNESIUM: CPT

## 2017-05-09 PROCEDURE — 82803 BLOOD GASES ANY COMBINATION: CPT

## 2017-05-09 PROCEDURE — 93010 ELECTROCARDIOGRAM REPORT: CPT | Mod: ,,, | Performed by: INTERNAL MEDICINE

## 2017-05-09 PROCEDURE — 93005 ELECTROCARDIOGRAM TRACING: CPT

## 2017-05-09 PROCEDURE — 27000221 HC OXYGEN, UP TO 24 HOURS

## 2017-05-09 PROCEDURE — 99232 SBSQ HOSP IP/OBS MODERATE 35: CPT | Mod: ,,, | Performed by: NURSE PRACTITIONER

## 2017-05-09 PROCEDURE — 25000003 PHARM REV CODE 250: Performed by: STUDENT IN AN ORGANIZED HEALTH CARE EDUCATION/TRAINING PROGRAM

## 2017-05-09 PROCEDURE — P9045 ALBUMIN (HUMAN), 5%, 250 ML: HCPCS

## 2017-05-09 PROCEDURE — 85610 PROTHROMBIN TIME: CPT

## 2017-05-09 PROCEDURE — 94150 VITAL CAPACITY TEST: CPT

## 2017-05-09 PROCEDURE — 85730 THROMBOPLASTIN TIME PARTIAL: CPT | Mod: 91

## 2017-05-09 PROCEDURE — 84100 ASSAY OF PHOSPHORUS: CPT

## 2017-05-09 PROCEDURE — 80048 BASIC METABOLIC PNL TOTAL CA: CPT

## 2017-05-09 PROCEDURE — 97161 PT EVAL LOW COMPLEX 20 MIN: CPT

## 2017-05-09 RX ORDER — CALCIUM CARBONATE 200(500)MG
500 TABLET,CHEWABLE ORAL DAILY PRN
Status: DISCONTINUED | OUTPATIENT
Start: 2017-05-09 | End: 2017-05-13

## 2017-05-09 RX ORDER — HYDRALAZINE HYDROCHLORIDE 50 MG/1
50 TABLET, FILM COATED ORAL EVERY 8 HOURS PRN
Status: DISCONTINUED | OUTPATIENT
Start: 2017-05-09 | End: 2017-05-13

## 2017-05-09 RX ORDER — NAPROXEN SODIUM 220 MG/1
81 TABLET, FILM COATED ORAL DAILY
Status: DISCONTINUED | OUTPATIENT
Start: 2017-05-10 | End: 2017-05-13 | Stop reason: HOSPADM

## 2017-05-09 RX ORDER — IBUPROFEN 200 MG
16 TABLET ORAL
Status: DISCONTINUED | OUTPATIENT
Start: 2017-05-09 | End: 2017-05-13

## 2017-05-09 RX ORDER — WARFARIN SODIUM 5 MG/1
5 TABLET ORAL DAILY
Status: DISCONTINUED | OUTPATIENT
Start: 2017-05-09 | End: 2017-05-10

## 2017-05-09 RX ORDER — TRAMADOL HYDROCHLORIDE 50 MG/1
50 TABLET ORAL EVERY 6 HOURS PRN
Status: DISCONTINUED | OUTPATIENT
Start: 2017-05-09 | End: 2017-05-13 | Stop reason: HOSPADM

## 2017-05-09 RX ORDER — ALBUMIN HUMAN 50 G/1000ML
25 SOLUTION INTRAVENOUS ONCE
Status: COMPLETED | OUTPATIENT
Start: 2017-05-09 | End: 2017-05-09

## 2017-05-09 RX ORDER — INSULIN ASPART 100 [IU]/ML
0-5 INJECTION, SOLUTION INTRAVENOUS; SUBCUTANEOUS
Status: DISCONTINUED | OUTPATIENT
Start: 2017-05-09 | End: 2017-05-13

## 2017-05-09 RX ORDER — METOPROLOL TARTRATE 25 MG/1
25 TABLET, FILM COATED ORAL 2 TIMES DAILY
Status: DISCONTINUED | OUTPATIENT
Start: 2017-05-09 | End: 2017-05-10

## 2017-05-09 RX ORDER — OXYCODONE HYDROCHLORIDE 5 MG/1
10 TABLET ORAL EVERY 4 HOURS PRN
Status: DISCONTINUED | OUTPATIENT
Start: 2017-05-09 | End: 2017-05-09

## 2017-05-09 RX ORDER — HEPARIN SODIUM 10000 [USP'U]/100ML
600 INJECTION, SOLUTION INTRAVENOUS CONTINUOUS
Status: DISCONTINUED | OUTPATIENT
Start: 2017-05-09 | End: 2017-05-11

## 2017-05-09 RX ORDER — IBUPROFEN 200 MG
24 TABLET ORAL
Status: DISCONTINUED | OUTPATIENT
Start: 2017-05-09 | End: 2017-05-13

## 2017-05-09 RX ORDER — TRAMADOL HYDROCHLORIDE 50 MG/1
100 TABLET ORAL EVERY 6 HOURS PRN
Status: DISCONTINUED | OUTPATIENT
Start: 2017-05-09 | End: 2017-05-13

## 2017-05-09 RX ORDER — METOPROLOL TARTRATE 1 MG/ML
INJECTION, SOLUTION INTRAVENOUS
Status: COMPLETED
Start: 2017-05-09 | End: 2017-05-09

## 2017-05-09 RX ORDER — FERROUS SULFATE 325(65) MG
325 TABLET, DELAYED RELEASE (ENTERIC COATED) ORAL DAILY
Status: DISCONTINUED | OUTPATIENT
Start: 2017-05-09 | End: 2017-05-13 | Stop reason: HOSPADM

## 2017-05-09 RX ORDER — METOPROLOL TARTRATE 1 MG/ML
5 INJECTION, SOLUTION INTRAVENOUS ONCE
Status: DISCONTINUED | OUTPATIENT
Start: 2017-05-09 | End: 2017-05-13

## 2017-05-09 RX ORDER — GLUCAGON 1 MG
1 KIT INJECTION
Status: DISCONTINUED | OUTPATIENT
Start: 2017-05-09 | End: 2017-05-13

## 2017-05-09 RX ORDER — PANTOPRAZOLE SODIUM 40 MG/1
40 TABLET, DELAYED RELEASE ORAL DAILY
Status: DISCONTINUED | OUTPATIENT
Start: 2017-05-09 | End: 2017-05-10

## 2017-05-09 RX ORDER — FAMOTIDINE 10 MG/ML
20 INJECTION INTRAVENOUS 2 TIMES DAILY
Status: DISCONTINUED | OUTPATIENT
Start: 2017-05-09 | End: 2017-05-10

## 2017-05-09 RX ORDER — METOPROLOL TARTRATE 1 MG/ML
5 INJECTION, SOLUTION INTRAVENOUS ONCE
Status: COMPLETED | OUTPATIENT
Start: 2017-05-09 | End: 2017-05-09

## 2017-05-09 RX ORDER — AMLODIPINE BESYLATE 5 MG/1
5 TABLET ORAL DAILY
Status: DISCONTINUED | OUTPATIENT
Start: 2017-05-09 | End: 2017-05-11

## 2017-05-09 RX ADMIN — METOPROLOL TARTRATE 5 MG: 5 INJECTION INTRAVENOUS at 06:05

## 2017-05-09 RX ADMIN — ASPIRIN 325 MG ORAL TABLET 325 MG: 325 PILL ORAL at 09:05

## 2017-05-09 RX ADMIN — OXYCODONE HYDROCHLORIDE 5 MG: 5 TABLET ORAL at 04:05

## 2017-05-09 RX ADMIN — WARFARIN SODIUM 5 MG: 5 TABLET ORAL at 05:05

## 2017-05-09 RX ADMIN — ALUMINUM HYDROXIDE, MAGNESIUM HYDROXIDE, AND SIMETHICONE 50 ML: 200; 200; 20 SUSPENSION ORAL at 11:05

## 2017-05-09 RX ADMIN — OXYCODONE HYDROCHLORIDE 5 MG: 5 TABLET ORAL at 09:05

## 2017-05-09 RX ADMIN — MORPHINE SULFATE 2 MG: 2 INJECTION, SOLUTION INTRAMUSCULAR; INTRAVENOUS at 10:05

## 2017-05-09 RX ADMIN — DEXTROSE 2 G: 50 INJECTION, SOLUTION INTRAVENOUS at 02:05

## 2017-05-09 RX ADMIN — HEPARIN SODIUM AND DEXTROSE 400 UNITS/HR: 10000; 5 INJECTION INTRAVENOUS at 10:05

## 2017-05-09 RX ADMIN — Medication 500 MG: at 10:05

## 2017-05-09 RX ADMIN — AMIODARONE HYDROCHLORIDE 1 MG/MIN: 1.8 INJECTION, SOLUTION INTRAVENOUS at 10:05

## 2017-05-09 RX ADMIN — AMIODARONE HYDROCHLORIDE 150 MG: 1.5 INJECTION, SOLUTION INTRAVENOUS at 10:05

## 2017-05-09 RX ADMIN — ONDANSETRON 4 MG: 2 INJECTION INTRAMUSCULAR; INTRAVENOUS at 09:05

## 2017-05-09 RX ADMIN — FAMOTIDINE 20 MG: 10 INJECTION, SOLUTION INTRAVENOUS at 11:05

## 2017-05-09 RX ADMIN — STANDARDIZED SENNA CONCENTRATE AND DOCUSATE SODIUM 1 TABLET: 8.6; 5 TABLET, FILM COATED ORAL at 09:05

## 2017-05-09 RX ADMIN — METOPROLOL TARTRATE 25 MG: 25 TABLET ORAL at 09:05

## 2017-05-09 RX ADMIN — ALBUMIN (HUMAN) 25 G: 12.5 SOLUTION INTRAVENOUS at 12:05

## 2017-05-09 RX ADMIN — METOPROLOL TARTRATE 5 MG: 1 INJECTION, SOLUTION INTRAVENOUS at 06:05

## 2017-05-09 RX ADMIN — OXYCODONE HYDROCHLORIDE 5 MG: 5 TABLET ORAL at 05:05

## 2017-05-09 RX ADMIN — PANTOPRAZOLE SODIUM 40 MG: 40 TABLET, DELAYED RELEASE ORAL at 09:05

## 2017-05-09 RX ADMIN — POTASSIUM CHLORIDE 20 MEQ: 200 INJECTION, SOLUTION INTRAVENOUS at 01:05

## 2017-05-09 RX ADMIN — CALCIUM CARBONATE (ANTACID) CHEW TAB 500 MG 500 MG: 500 CHEW TAB at 09:05

## 2017-05-09 RX ADMIN — HYDRALAZINE HYDROCHLORIDE 50 MG: 50 TABLET ORAL at 06:05

## 2017-05-09 RX ADMIN — MUPIROCIN 1 G: 20 OINTMENT TOPICAL at 09:05

## 2017-05-09 RX ADMIN — SODIUM PHOSPHATE, MONOBASIC, MONOHYDRATE 15 MMOL: 276; 142 INJECTION, SOLUTION INTRAVENOUS at 06:05

## 2017-05-09 RX ADMIN — MUPIROCIN 1 G: 20 OINTMENT TOPICAL at 10:05

## 2017-05-09 RX ADMIN — NICARDIPINE HYDROCHLORIDE 5 MG/HR: 0.2 INJECTION, SOLUTION INTRAVENOUS at 12:05

## 2017-05-09 RX ADMIN — DEXTROSE 2 G: 50 INJECTION, SOLUTION INTRAVENOUS at 09:05

## 2017-05-09 RX ADMIN — FERROUS SULFATE TAB EC 325 MG (65 MG FE EQUIVALENT) 325 MG: 325 (65 FE) TABLET DELAYED RESPONSE at 09:05

## 2017-05-09 NOTE — PROGRESS NOTES
The patient was weaned to 40%/+5/+10 CPAP. An ABG was obtained and reported to Dr. Pires. Weaning parameters were attempted , but the patient doesn't speak English. A NIF wasn't obtained. Dr. Pires is aware of this.      Vt  500  Vc  750    RSBI  50

## 2017-05-09 NOTE — PT/OT/SLP EVAL
Physical Therapy  Evaluation    Rea Ibarra   MRN: [id number]   Admitting Diagnosis: S/P AVR (aortic valve replacement)    PT Received On: 05/09/17  PT Start Time: 1129     PT Stop Time: 1141    PT Total Time (min): 12 min       Billable Minutes:  Evaluation 12 mins    Diagnosis: S/P AVR (aortic valve replacement) on 5/8      Past Medical History:   Diagnosis Date    Aortic stenosis     Bleeding ulcer     Iron deficiency anemia     Mitral stenosis     Myocardial infarction     about 2 years ago per patient      Past Surgical History:   Procedure Laterality Date    MANDIBLE FRACTURE SURGERY         Referring physician: Keanu Llamas MD  Date referred to PT: 5/8/17    General Precautions: Standard, sternal, fall, aspiration, NPO  Orthopedic Precautions: N/A   Braces: N/A       Do you have any cultural, spiritual, Holiness conflicts, given your current situation?: none noted     Patient History:  Lives With:  (10 yo, 8 yo children)  Living Arrangements: apartment  Home Accessibility: stairs to enter home  Home Layout: Able to live on 1st floor  Number of Stairs to Enter Home: 1  Transportation Available: family or friend will provide  Living Environment Comment:  (Friend of pt currently caring for pts children. )  Equipment Currently Used at Home: none  DME owned (not currently used): none    Previous Level of Function:  Ambulation Skills: independent  Transfer Skills: independent  ADL Skills: independent  Work/Leisure Activity: independent (Pt works as a  )    Subjective:  Communicated with RN prior to session.  Pt agreeable to therapy. Pt understands and speaks limited english, pts adult daughter assisted with communication. Pt grabbing abdomen throughout session with distressed look c/o pain in stomach.   Chief Complaint: stomach pain   Patient goals: to get better     Pain Rating: 10/10      Location - Orientation: generalized  Location:  (stomach)  Pain Addressed: Pre-medicate for activity,  Distraction  Pain Rating Post-Intervention: 10/10    Objective:   Patient found with: central line, peripheral IV, chest tube, quiñonez catheter, pulse ox (continuous), telemetry, blood pressure cuff     Cognitive Exam:  Oriented to: Person, Place, Time and Situation    Follows Commands/attention: Follows two-step commands  Communication: clear/fluent  Safety awareness/insight to disability: intact    Physical Exam:  Postural examination/scapula alignment: Rounded shoulder and Head forward    Skin integrity: Visible skin intact  Edema: None noted     Upper Extremity Range of Motion:  Right Upper Extremity: WFL  Left Upper Extremity: WFL    Upper Extremity Strength:  Not assessed due to sternal precautions     Lower Extremity Range of Motion:  Right Lower Extremity: WFL  Left Lower Extremity: WFL    Lower Extremity Strength:  Right Lower Extremity: WFL  Left Lower Extremity: WFL    Gross motor coordination: WFL    Functional Mobility:  Transfers:  Sit <> Stand Assistance: Contact Guard Assistance  Sit <> Stand Assistive Device: No Assistive Device    Gait:   Gait Distance: Pt ambulated 4 steps forward and 4 steps backward   Assistance 1: Contact Guard Assistance  Gait Assistive Device: No device  Gait Pattern: swing-to gait  Gait Deviation(s): decreased staci, increased time in double stance, decreased velocity of limb motion, decreased step length, decreased stride length, decreased toe-to-floor clearance, decreased weight-shifting ability    Balance:   Static Sit: FAIR+: Able to take MINIMAL challenges from all directions  Dynamic Sit: FAIR+: Maintains balance through MINIMAL excursions of active trunk motion  Static Stand: FAIR: Maintains without assist but unable to take challenges  Dynamic stand: FAIR: Needs CONTACT GUARD during gait    Therapeutic Activities and Exercises:  Session time limited due to significant gastric pain. SPT educated pt on exercises including LAQ, marching, ankle pumps, and shoulder  flexion. Pt performed each exercise once to demonstrate understanding. Pt encouraged to perform exercises independently throughout the day. SPT also reviewed and educated pt on sternal precautions. Pt verbalized understanding and was compliant throughout session.     AM-PAC 6 CLICK MOBILITY  How much help from another person does this patient currently need?   1 = Unable, Total/Dependent Assistance  2 = A lot, Maximum/Moderate Assistance  3 = A little, Minimum/Contact Guard/Supervision  4 = None, Modified [place]/Independent    Turning over in bed (including adjusting bedclothes, sheets and blankets)?: 2  Sitting down on and standing up from a chair with arms (e.g., wheelchair, bedside commode, etc.): 3  Moving from lying on back to sitting on the side of the bed?: 2  Moving to and from a bed to a chair (including a wheelchair)?: 3  Need to walk in hospital room?: 2  Climbing 3-5 steps with a railing?: 1  Total Score: 13     AM-PAC Raw Score CMS G-Code Modifier Level of Impairment Assistance   6 % Total / Unable   7 - 9 CM 80 - 100% Maximal Assist   10 - 14 CL 60 - 80% Moderate Assist   15 - 19 CK 40 - 60% Moderate Assist   20 - 22 CJ 20 - 40% Minimal Assist   23 CI 1-20% SBA / CGA   24 CH 0% Independent/ Mod I     Patient left up in chair with all lines intact, call button in reach, RN notified and daughter present.    Assessment:   Rea Ibarra is a 42 y.o. female with a medical diagnosis of S/P AVR (aortic valve replacement) and presents with decreased functional mobility and low activity tolerance with significant gastric pain. Pt tolerated all activity well with no increase in pain and no other adverse physical symptoms. Pt is progressing well and is very compliant with sternal precautions. Pt would benefit from skilled therapy intervention to address deficits listed and progress toward increased functional mobility.     Rehab identified problem list/impairments: Rehab identified problem  list/impairments: weakness, impaired endurance, impaired functional mobilty, gait instability, impaired balance, decreased upper extremity function, pain, impaired cardiopulmonary response to activity    Rehab potential is good.    Activity tolerance: Good    Discharge recommendations: Discharge Facility/Level Of Care Needs: home with home health     Barriers to discharge: Barriers to Discharge: Inaccessible home environment, Decreased caregiver support    Equipment recommendations: Equipment Needed After Discharge:  (TBD pending progress )     GOALS:   Physical Therapy Goals        Problem: Physical Therapy Goal    Goal Priority Disciplines Outcome Goal Variances Interventions   Physical Therapy Goal     PT/OT, PT Ongoing (interventions implemented as appropriate)     Description:  Goals to be met by: 17      Patient will increase functional independence with mobility by performin. Supine to sit with MInimal Assistance  2. Sit to supine with MInimal Assistance  3. Rolling to Left and Right with Minimal Assistance.  4. Sit to stand transfer with Supervision  5. Bed to chair transfer with Supervision using no AD  6. Gait  x 150 feet with Stand-by Assistance using no AD.   7. Lower extremity exercise program x15 reps per handout, with assistance as needed                PLAN:    Patient to be seen 5 x/week to address the above listed problems via gait training, therapeutic activities, therapeutic exercises  Plan of Care expires: 17  Plan of Care reviewed with: patient, daughter          Lary Dozier, SPT  2017

## 2017-05-09 NOTE — ANESTHESIA POSTPROCEDURE EVALUATION
"Anesthesia Post Evaluation    Patient: Rea Ibarra    Procedure(s) Performed: Procedure(s) (LRB):  REPLACEMENT-VALVE-AORTIC (N/A)  REPLACEMENT-VALVE-MITRAL (N/A)    Final Anesthesia Type: general  Patient location during evaluation: ICU  Patient participation: Yes- Able to Participate  Level of consciousness: awake and alert and oriented  Post-procedure vital signs: reviewed and stable  Pain management: adequate  Airway patency: patent  PONV status at discharge: No PONV  Anesthetic complications: no      Cardiovascular status: blood pressure returned to baseline and hemodynamically stable  Respiratory status: unassisted and spontaneous ventilation  Hydration status: euvolemic  Follow-up not needed.        Visit Vitals    BP 99/68    Pulse 84    Temp (!) 38.2 °C (100.7 °F) (Oral)    Resp 14    Ht 5' 2" (1.575 m)    Wt 75.1 kg (165 lb 9.1 oz)    LMP  (Within Months)    SpO2 100%    Breastfeeding No    BMI 30.28 kg/m2       Pain/Eladio Score: Pain Assessment Performed: Yes (5/9/2017  4:30 AM)  Presence of Pain: non-verbal indicators present (med given thru ogt prior to extubation) (5/9/2017  4:30 AM)  Pain Rating Prior to Med Admin: 7 (5/9/2017  4:30 AM)  Pain Rating Post Med Admin: 2 (5/9/2017  5:25 AM)      "

## 2017-05-09 NOTE — PLAN OF CARE
Problem: Patient Care Overview  Goal: Plan of Care Review  Outcome: Ongoing (interventions implemented as appropriate)  Patient calm, cooperative, with 02sats 98% on 2 Liters nasal cannula. Patient tolerated sitting in chair most of the day. Patient with daughter at bedside. Patient and patient's daughter updated on plan of care for the day. Patient with transfer orders. Dr. Llamas updated on patient's urine output, vital signs, and assessment. Will continue to monitor patient.

## 2017-05-09 NOTE — ASSESSMENT & PLAN NOTE
D/c insulin gtt start  Low dose correction with ac/hs bg monitoring  If no insulin needs will sign off  In am    Discharge Planning: Anticipating resolution.

## 2017-05-09 NOTE — CONSULTS
[facility]  Adult Nutrition  Consult Note    SUMMARY     Recommendations    Recommendation/Intervention: 1. Cont w/ current diet order; if meal intake is consistently <50%, order Boost Plus TID  2. RD to cont to monitor all nutr parameters  Goals: Maintain meal intake >/=75%  Nutrition Goal Status: new     Reason for Assessment    Reason for Assessment: physician consult  Diagnosis: other (see comments) (s/p AVR/MVR 5/8/17)  Relevent Medical History: rheumatic HD, MI   Interdisciplinary Rounds: attended     General Information Comments: Pt seen this a.m. after being extubated. Diet adv'd. Pt does not speak much English but was able to answer some of my questions.     Nutrition Discharge Planning: Home on cardiac diet    Nutrition Prescription Ordered    Current Diet Order: Cardiac     % Intake of Estimated Energy Needs: 0 - 25 %  % Meal Intake: Other: diet just adv'd today    Nutrition/Diet History       Typical Food/Fluid Intake: adequate pta           Factors Affecting Nutritional Intake: other (see comments) (POD# 1)                Labs/Tests/Procedures/Meds       Pertinent Labs Reviewed: reviewed  Pertinent Labs Comments: A1C 5.3, Glu 135, POCT 124-129, Phos 2.5  Pertinent Medications Reviewed: reviewed  Pertinent Medications Comments: IVF(D5 1/2 NS w/ 20meq KCl @ 25ml/hr), abx, insulin, cardene, albumin, Vit C, Fe, pantoprazole, senna-docusate, warfarin    Physical Findings    Overall Physical Appearance: on oxygen therapy, overweight        Skin: incision (chest tubes in place-output noted)    Anthropometrics       Height (inches): 62.01 in  Weight Method: Bed Scale  Weight (kg): 75.1 kg     Ideal Body Weight (IBW), Female: 110.05 lb     % Ideal Body Weight, Female (lb): 150.45 lb  BMI (kg/m2): 30.27  BMI Grade: 30 - 34.9- obesity - grade I        Estimated/Assessed Needs    Weight Used For Calorie Calculations: 75.1 kg (165 lb 9.1 oz)   Height (cm): 157.5 cm     Energy Need Method:  Gilliam-St Adamor (x 1.25 (PAL) = 1703 cals daily)     RMR (Gilliam-St. Jeor Equation): 1362.06        Weight Used For Protein Calculations: 75.1 kg (165 lb 9.1 oz)  Protein Requirements: 75-90 gms (1-1.2 gms/kg)    Fluid Need Method: RDA Method (1ml/kcal or per MD)        Assessment and Plan    No nutr dx @ this time.    Monitor and Evaluation    Food and Nutrient Intake: energy intake, food and beverage intake  Food and Nutrient Adminstration: diet order     Physical Activity and Function: nutrition-related ADLs and IADLs  Anthropometric Measurements: weight, weight change  Biochemical Data, Medical Tests and Procedures: electrolyte and renal panel, glucose/endocrine profile  Nutrition-Focused Physical Findings: overall appearance      Nutrition Risk    Level of Risk: other (see comments) (F/u 1 x weekly)    Nutrition Follow-Up    RD Follow-up?: Yes

## 2017-05-09 NOTE — PLAN OF CARE
Problem: Patient Care Overview  Goal: Plan of Care Review  Recommendations     Recommendation/Intervention: 1. Cont w/ current diet order; if meal intake is consistently <50%, order Boost Plus TID 2. RD to cont to monitor all nutr parameters  Goals: Maintain meal intake >/=75%  Nutrition Goal Status: new

## 2017-05-09 NOTE — PLAN OF CARE
Problem: Physical Therapy Goal  Goal: Physical Therapy Goal  Goals to be met by: 17     Patient will increase functional independence with mobility by performin. Supine to sit with MInimal Assistance  2. Sit to supine with MInimal Assistance  3. Rolling to Left and Right with Minimal Assistance.  4. Sit to stand transfer with Supervision  5. Bed to chair transfer with Supervision using no AD  6. Gait x 150 feet with Stand-by Assistance using no AD.   7. Lower extremity exercise program x15 reps per handout, with assistance as needed  Outcome: Ongoing (interventions implemented as appropriate)  Goals established this session.

## 2017-05-09 NOTE — PLAN OF CARE
S/P AVR, MVR(mechanical) on 5/8/17. CM gave pt heart surgery pillow to use to splint incision to cough and deep breathe. CM attempt to ask pt dc assessment questionaire but pt prefers for CM to speak to daughter. Marilyn resting with eyes closed at this time. CM will follow up .          05/09/17 1416   Discharge Assessment   Assessment Type Discharge Planning Assessment

## 2017-05-09 NOTE — PROGRESS NOTES
"[facility]  Endocrinology  Progress Note    Admit Date: 5/8/2017     Reason for Consult: Management of  Hyperglycemia     Surgical Procedure and Date: 5/8/2017 REPLACEMENT-VALVE-AORTIC and REPLACEMENT-VALVE-MITRAL    HPI: Patient is a 42 y.o. 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 + CURIEL that has forced her to quit working 4 months ago. She is now s/p AVR and MVR with stress induced hyperglycemia. Endocrine consulted for BG Management.         Interval HPI:   Extubated, OOB in chair, minimal insulin needs overnight, no hypoglycemia, diet to advance as tolerated today    BP (!) 104/52  Pulse 79  Temp (!) 100.7 °F (38.2 °C) (Oral)   Resp 14  Ht 5' 2" (1.575 m)  Wt 75.1 kg (165 lb 9.1 oz)  LMP  (Within Months)  SpO2 100%  Breastfeeding? No  BMI 30.28 kg/m2    Labs Reviewed and Include      Recent Labs  Lab 05/09/17  0400   *   CALCIUM 8.3*      K 4.2   CO2 18*   *   BUN 7   CREATININE 0.8     Lab Results   Component Value Date    WBC 14.65 (H) 05/09/2017    HGB 12.8 05/09/2017    HCT 36.5 (L) 05/09/2017    MCV 87 05/09/2017     (L) 05/09/2017     No results for input(s): TSH, FREET4 in the last 168 hours.  Lab Results   Component Value Date    HGBA1C 5.3 05/04/2017       Nutritional status:   Body mass index is 30.28 kg/(m^2).  Lab Results   Component Value Date    ALBUMIN 4.4 05/04/2017    ALBUMIN 4.6 03/07/2016    ALBUMIN 4.1 09/03/2015     No results found for: PREALBUMIN    Estimated Creatinine Clearance: 86 mL/min (based on Cr of 0.8).    Accu-Checks  Recent Labs      05/08/17   2108  05/08/17   2236  05/08/17   2337  05/09/17   0046  05/09/17   0133  05/09/17   0219  05/09/17   0325  05/09/17   0406  05/09/17   0522  05/09/17   0608   POCTGLUCOSE  130*  112*  114*  114*  126*  122*  103  129*  125*  124*       Current Medications and/or Treatments Impacting Glycemic Control  Immunotherapy:  " Immunosuppressants     None        Steroids:   Hormones     None        Pressors:    Autonomic Drugs     None        Hyperglycemia/Diabetes Medications: Antihyperglycemics     Start     Stop Route Frequency Ordered    05/09/17 0821  insulin aspart pen 0-5 Units      -- SubQ Before meals & nightly PRN 05/09/17 0722          ASSESSMENT and PLAN    * S/P AVR (aortic valve replacement)  Managed per CTS.       History of MI (myocardial infarction)  Avoid hypoglycemia.       Rheumatic aortic stenosis  Now s/p AVR       Mitral valve stenosis, rheumatic  Now s/p MVR     Stress hyperglycemia  D/c insulin gtt start  Low dose correction with ac/hs bg monitoring  If no insulin needs will sign off  In am    Discharge Planning: Anticipating resolution.       S/P MVR (mitral valve replacement)  Managed per CTS.         Elise Verde, DNP, NP  Endocrinology  [facility]

## 2017-05-09 NOTE — PROGRESS NOTES
Progress Note  Surgical Intensive Care    SUBJECTIVE:     Chief Complaint/Reason for Admission: aortic stenosis    History of Present Illness:  Patient is a 42 y.o. female with tobacco use, likely history of MI, rheumatic HD with with mixed MS/MR and AS/AI, history of ? Cardiac arrest who has CURIEL, syncope and angina. Patient is admitted for AVR and MVR 5/8/17. Patinent transferred to ICU post-op intubated on amicar, epinephrine, Cardene, and insulin.     Interval History:  NAEON.  Patient off levo and epi.  Extubated this AM without difficulty    PTA Medications   Medication Sig    metoprolol tartrate (LOPRESSOR) 25 MG tablet Take 25 mg by mouth 2 (two) times daily.    ferrous sulfate 325 (65 FE) MG EC tablet Take 325 mg by mouth 3 (three) times daily with meals.       Review of patient's allergies indicates:  No Known Allergies    Past Medical History:   Diagnosis Date    Aortic stenosis     Bleeding ulcer     Iron deficiency anemia     Mitral stenosis     Myocardial infarction     about 2 years ago per patient     Past Surgical History:   Procedure Laterality Date    MANDIBLE FRACTURE SURGERY       Family History   Problem Relation Age of Onset    No Known Problems Mother     No Known Problems Father      Social History   Substance Use Topics    Smoking status: Former Smoker     Packs/day: 0.00     Types: Cigarettes    Smokeless tobacco: Former User     Quit date: 4/1/2016      Comment: 2 to 3 cigarettes per week    Alcohol use No        Review of Systems:  Negative except mentioned in HPI     OBJECTIVE:     Vital Signs (Most Recent)  Temp: 99.2 °F (37.3 °C) (05/09/17 0700)  Pulse: 79 (05/09/17 1000)  Resp: 14 (05/08/17 1945)  BP: (!) 104/52 (05/09/17 0912)  SpO2: 100 % (05/09/17 1000)    Physical Exam:  General: NAD, well developed, well nourished  Eyes:  conjunctivae/corneas clear. PERRL.  Throat: limited visualization due to endotracheal tube  Neck: supple, symmetrical, trachea midline, no JVD,  thyroid not enlarged, symmetric, no tenderness/mass/nodules and R IJ CVC in place  Lungs:  clear to auscultation bilaterally and mechanical ventilation  Chest Wall: chest tubes in place, dressing clean dry and in tact  Heart: regular rate and rhythm  Abdomen: soft, non-tender non-distented; bowel sounds normal  Extremities: no cyanosis or edema, or clubbing  Neurologic: AAOx3    Lines/Drains:       Peripheral IV - Single Lumen 05/08/17 0550 Right Hand (Active)   Site Assessment Clean;Dry;Intact;No redness;No swelling 5/8/2017  6:01 AM   Line Status Blood return noted;Flushed;Infusing 5/8/2017  6:01 AM   Dressing Status Clean;Dry;Intact 5/8/2017  6:01 AM   Dressing Intervention New dressing 5/8/2017  6:01 AM   Number of days:0            Arterial Line 05/08/17 0710 Left Radial (Active)   Number of days:0            Urethral Catheter 05/08/17 0724 Temperature probe;Straight-tip;Non-latex 16 Fr. (Active)   Output (mL) 150 mL 5/8/2017  2:00 PM   Number of days:0            Y Chest Tube 1 and 2 05/08/17 1239 Mediastinal 19 Fr. Mediastinal 19 Fr. (Active)   Output (mL) 25 mL 5/8/2017  2:00 PM   Number of days:0       Laboratory  CBC:     Recent Labs  Lab 05/09/17  0400   WBC 14.65*   RBC 4.22   HGB 12.8   HCT 36.5*   *   MCV 87   MCH 30.3   MCHC 35.1     CMP:   Recent Labs  Lab 05/04/17  1105  05/09/17  0400   GLU 88  < > 135*   CALCIUM 10.0  < > 8.3*   ALBUMIN 4.4  --   --    PROT 7.6  --   --      < > 138   K 4.1  < > 4.2   CO2 23  < > 18*     < > 113*   BUN 10  < > 7   CREATININE 0.9  < > 0.8   ALKPHOS 62  --   --    ALT 12  --   --    AST 17  --   --    BILITOT 1.0  --   --    < > = values in this interval not displayed.  Coagulation:     Recent Labs  Lab 05/09/17  0400   INR 1.2   APTT 29.5     Cardiac markers: No results for input(s): CKMB, CPKMB, TROPONINT, TROPONINI, MYOGLOBIN in the last 168 hours.  ABGs:     Recent Labs  Lab 05/09/17  0357   PH 7.379   PCO2 28.7*   PO2 139*   HCO3 17.0*    POCSATURATED 99   BE -8       Chest X-Ray: Tubes and lines are appropriate.  There is borderline cardiomegaly postoperative change and mild perihilar edema.      ASSESSMENT/PLAN:     43 y/o woman with mixed MS/MR and AS/AI who is s/p AVR and MVR on 5/8.      Plan:  Neuro:   - hold when weaning to extubate  - morphine and oxy prn pain    Pulmonary:   - extubated this AM without difficulty  - Wean supplemental O2    Cardiac:  - s/p AVR and MVR   - off levo and epi  - cardene gtt for MAPs >80  - ASA 325mg  - HDS    Renal:   - BUN/Cr: 9/0.8  - monitor I/O's    Infectious Disease:   - Afebrile, WBC decreasing to 14  - ancef for 24hrs      Hematology/Oncology:  - H/H 12.8/36  - monitor with daily CBC  - Transfuse per primary       Endocrine:  - monitor glucose  - insulin gtt    Fluids/Electrolytes/Nutrition/GI:   - D5 1/2 NS KCl @ 25ml/hr  - NPO  - dietary consulted      Pain Management:   Oxy and morphine prn    Dispo: ICU Care      Hank Campbell M.D.  General Surgery PGY1  [phone]

## 2017-05-09 NOTE — SUBJECTIVE & OBJECTIVE
"Interval HPI:   Extubated, OOB in chair, minimal insulin needs overnight, no hypoglycemia, diet to advance as tolerated today    BP (!) 104/52  Pulse 79  Temp (!) 100.7 °F (38.2 °C) (Oral)   Resp 14  Ht 5' 2" (1.575 m)  Wt 75.1 kg (165 lb 9.1 oz)  LMP  (Within Months)  SpO2 100%  Breastfeeding? No  BMI 30.28 kg/m2    Labs Reviewed and Include      Recent Labs  Lab 05/09/17  0400   *   CALCIUM 8.3*      K 4.2   CO2 18*   *   BUN 7   CREATININE 0.8     Lab Results   Component Value Date    WBC 14.65 (H) 05/09/2017    HGB 12.8 05/09/2017    HCT 36.5 (L) 05/09/2017    MCV 87 05/09/2017     (L) 05/09/2017     No results for input(s): TSH, FREET4 in the last 168 hours.  Lab Results   Component Value Date    HGBA1C 5.3 05/04/2017       Nutritional status:   Body mass index is 30.28 kg/(m^2).  Lab Results   Component Value Date    ALBUMIN 4.4 05/04/2017    ALBUMIN 4.6 03/07/2016    ALBUMIN 4.1 09/03/2015     No results found for: PREALBUMIN    Estimated Creatinine Clearance: 86 mL/min (based on Cr of 0.8).    Accu-Checks  Recent Labs      05/08/17   2108  05/08/17   2236  05/08/17   2337  05/09/17   0046  05/09/17   0133  05/09/17   0219  05/09/17   0325  05/09/17   0406  05/09/17   0522  05/09/17   0608   POCTGLUCOSE  130*  112*  114*  114*  126*  122*  103  129*  125*  124*       Current Medications and/or Treatments Impacting Glycemic Control  Immunotherapy:  Immunosuppressants     None        Steroids:   Hormones     None        Pressors:    Autonomic Drugs     None        Hyperglycemia/Diabetes Medications: Antihyperglycemics     Start     Stop Route Frequency Ordered    05/09/17 0821  insulin aspart pen 0-5 Units      -- SubQ Before meals & nightly PRN 05/09/17 0722        "

## 2017-05-09 NOTE — PLAN OF CARE
Problem: Patient Care Overview  Goal: Individualization & Mutuality  Dx: MVR/AVR    Hx: tobacco use, likely history of MI (2008), rheumatic Heart disease with with mixed MS/MR    5/8- MVR/AVR   Outcome: Ongoing (interventions implemented as appropriate)  Speaks and understands very little English.  Extubated this AM.  Tolerated well.  Cardene, insulin, and mivf continue as ordered.  Replacements of electrolytes as ordered.  Plan of care reviewed with pt and daughter at beginning of shift.  No family members present overnight.  Refer to [facility] for assessments and notes.

## 2017-05-10 LAB
ANION GAP SERPL CALC-SCNC: 8 MMOL/L
APTT BLDCRRT: 55.1 SEC
APTT BLDCRRT: 62.9 SEC
APTT BLDCRRT: 77.3 SEC
BASOPHILS # BLD AUTO: 0.01 K/UL
BASOPHILS NFR BLD: 0.1 %
BUN SERPL-MCNC: 10 MG/DL
CALCIUM SERPL-MCNC: 8.6 MG/DL
CHLORIDE SERPL-SCNC: 108 MMOL/L
CO2 SERPL-SCNC: 18 MMOL/L
CREAT SERPL-MCNC: 0.7 MG/DL
DIFFERENTIAL METHOD: ABNORMAL
EOSINOPHIL # BLD AUTO: 0 K/UL
EOSINOPHIL NFR BLD: 0 %
ERYTHROCYTE [DISTWIDTH] IN BLOOD BY AUTOMATED COUNT: 13.3 %
EST. GFR  (AFRICAN AMERICAN): >60 ML/MIN/1.73 M^2
EST. GFR  (NON AFRICAN AMERICAN): >60 ML/MIN/1.73 M^2
GLUCOSE SERPL-MCNC: 162 MG/DL
HCT VFR BLD AUTO: 36.4 %
HGB BLD-MCNC: 12.2 G/DL
INR PPP: 1.9
LYMPHOCYTES # BLD AUTO: 0.9 K/UL
LYMPHOCYTES NFR BLD: 5.8 %
MAGNESIUM SERPL-MCNC: 2.3 MG/DL
MCH RBC QN AUTO: 30.3 PG
MCHC RBC AUTO-ENTMCNC: 33.5 %
MCV RBC AUTO: 91 FL
MONOCYTES # BLD AUTO: 1.3 K/UL
MONOCYTES NFR BLD: 8.6 %
NEUTROPHILS # BLD AUTO: 12.6 K/UL
NEUTROPHILS NFR BLD: 85.2 %
PHOSPHATE SERPL-MCNC: 2.5 MG/DL
PLATELET # BLD AUTO: 111 K/UL
PMV BLD AUTO: 10.1 FL
POCT GLUCOSE: 133 MG/DL (ref 70–110)
POCT GLUCOSE: 146 MG/DL (ref 70–110)
POCT GLUCOSE: 153 MG/DL (ref 70–110)
POCT GLUCOSE: 176 MG/DL (ref 70–110)
POTASSIUM SERPL-SCNC: 4.2 MMOL/L
PROTHROMBIN TIME: 19.3 SEC
RBC # BLD AUTO: 4.02 M/UL
SODIUM SERPL-SCNC: 134 MMOL/L
WBC # BLD AUTO: 14.74 K/UL

## 2017-05-10 PROCEDURE — 25000003 PHARM REV CODE 250

## 2017-05-10 PROCEDURE — 85730 THROMBOPLASTIN TIME PARTIAL: CPT | Mod: 91

## 2017-05-10 PROCEDURE — 85730 THROMBOPLASTIN TIME PARTIAL: CPT

## 2017-05-10 PROCEDURE — 85025 COMPLETE CBC W/AUTO DIFF WBC: CPT

## 2017-05-10 PROCEDURE — 99233 SBSQ HOSP IP/OBS HIGH 50: CPT | Mod: ,,, | Performed by: SURGERY

## 2017-05-10 PROCEDURE — 83735 ASSAY OF MAGNESIUM: CPT

## 2017-05-10 PROCEDURE — 99231 SBSQ HOSP IP/OBS SF/LOW 25: CPT | Mod: ,,, | Performed by: NURSE PRACTITIONER

## 2017-05-10 PROCEDURE — 63600175 PHARM REV CODE 636 W HCPCS

## 2017-05-10 PROCEDURE — 25000003 PHARM REV CODE 250: Performed by: STUDENT IN AN ORGANIZED HEALTH CARE EDUCATION/TRAINING PROGRAM

## 2017-05-10 PROCEDURE — 63600175 PHARM REV CODE 636 W HCPCS: Performed by: STUDENT IN AN ORGANIZED HEALTH CARE EDUCATION/TRAINING PROGRAM

## 2017-05-10 PROCEDURE — 84100 ASSAY OF PHOSPHORUS: CPT

## 2017-05-10 PROCEDURE — 85610 PROTHROMBIN TIME: CPT

## 2017-05-10 PROCEDURE — 20600001 HC STEP DOWN PRIVATE ROOM

## 2017-05-10 PROCEDURE — 80048 BASIC METABOLIC PNL TOTAL CA: CPT

## 2017-05-10 RX ORDER — FAMOTIDINE 20 MG/1
20 TABLET, FILM COATED ORAL 2 TIMES DAILY
Status: DISCONTINUED | OUTPATIENT
Start: 2017-05-10 | End: 2017-05-13 | Stop reason: HOSPADM

## 2017-05-10 RX ORDER — WARFARIN 3 MG/1
3 TABLET ORAL DAILY
Status: DISCONTINUED | OUTPATIENT
Start: 2017-05-10 | End: 2017-05-11

## 2017-05-10 RX ORDER — FUROSEMIDE 10 MG/ML
40 INJECTION INTRAMUSCULAR; INTRAVENOUS 2 TIMES DAILY
Status: DISCONTINUED | OUTPATIENT
Start: 2017-05-10 | End: 2017-05-11

## 2017-05-10 RX ORDER — METOPROLOL TARTRATE 25 MG/1
25 TABLET, FILM COATED ORAL 3 TIMES DAILY
Status: DISCONTINUED | OUTPATIENT
Start: 2017-05-10 | End: 2017-05-11

## 2017-05-10 RX ORDER — POTASSIUM CHLORIDE 750 MG/1
20 CAPSULE, EXTENDED RELEASE ORAL 2 TIMES DAILY
Status: DISCONTINUED | OUTPATIENT
Start: 2017-05-10 | End: 2017-05-11

## 2017-05-10 RX ADMIN — FAMOTIDINE 20 MG: 20 TABLET, FILM COATED ORAL at 09:05

## 2017-05-10 RX ADMIN — TRAMADOL HYDROCHLORIDE 100 MG: 50 TABLET, FILM COATED ORAL at 08:05

## 2017-05-10 RX ADMIN — STANDARDIZED SENNA CONCENTRATE AND DOCUSATE SODIUM 1 TABLET: 8.6; 5 TABLET, FILM COATED ORAL at 09:05

## 2017-05-10 RX ADMIN — FUROSEMIDE 40 MG: 10 INJECTION, SOLUTION INTRAVENOUS at 05:05

## 2017-05-10 RX ADMIN — MUPIROCIN 1 G: 20 OINTMENT TOPICAL at 09:05

## 2017-05-10 RX ADMIN — FERROUS SULFATE TAB EC 325 MG (65 MG FE EQUIVALENT) 325 MG: 325 (65 FE) TABLET DELAYED RESPONSE at 08:05

## 2017-05-10 RX ADMIN — Medication 500 MG: at 08:05

## 2017-05-10 RX ADMIN — PANTOPRAZOLE SODIUM 40 MG: 40 TABLET, DELAYED RELEASE ORAL at 08:05

## 2017-05-10 RX ADMIN — METOPROLOL TARTRATE 25 MG: 25 TABLET ORAL at 08:05

## 2017-05-10 RX ADMIN — AMIODARONE HYDROCHLORIDE 0.5 MG/MIN: 1.8 INJECTION, SOLUTION INTRAVENOUS at 05:05

## 2017-05-10 RX ADMIN — SODIUM PHOSPHATE, MONOBASIC, MONOHYDRATE 15 MMOL: 276; 142 INJECTION, SOLUTION INTRAVENOUS at 05:05

## 2017-05-10 RX ADMIN — STANDARDIZED SENNA CONCENTRATE AND DOCUSATE SODIUM 1 TABLET: 8.6; 5 TABLET, FILM COATED ORAL at 08:05

## 2017-05-10 RX ADMIN — FAMOTIDINE 20 MG: 10 INJECTION, SOLUTION INTRAVENOUS at 08:05

## 2017-05-10 RX ADMIN — MUPIROCIN 1 G: 20 OINTMENT TOPICAL at 08:05

## 2017-05-10 RX ADMIN — MORPHINE SULFATE 2 MG: 2 INJECTION, SOLUTION INTRAMUSCULAR; INTRAVENOUS at 01:05

## 2017-05-10 RX ADMIN — AMLODIPINE BESYLATE 5 MG: 5 TABLET ORAL at 08:05

## 2017-05-10 RX ADMIN — WARFARIN SODIUM 3 MG: 2 TABLET ORAL at 05:05

## 2017-05-10 RX ADMIN — POTASSIUM CHLORIDE 20 MEQ: 750 CAPSULE, EXTENDED RELEASE ORAL at 01:05

## 2017-05-10 RX ADMIN — POTASSIUM CHLORIDE 20 MEQ: 750 CAPSULE, EXTENDED RELEASE ORAL at 09:05

## 2017-05-10 RX ADMIN — METOPROLOL TARTRATE 25 MG: 25 TABLET ORAL at 09:05

## 2017-05-10 RX ADMIN — AMIODARONE HYDROCHLORIDE 0.5 MG/MIN: 1.8 INJECTION, SOLUTION INTRAVENOUS at 03:05

## 2017-05-10 RX ADMIN — Medication 500 MG: at 09:05

## 2017-05-10 RX ADMIN — TRAMADOL HYDROCHLORIDE 100 MG: 50 TABLET, FILM COATED ORAL at 01:05

## 2017-05-10 RX ADMIN — ASPIRIN 81 MG CHEWABLE TABLET 81 MG: 81 TABLET CHEWABLE at 08:05

## 2017-05-10 RX ADMIN — DEXTROSE MONOHYDRATE, SODIUM CHLORIDE, AND POTASSIUM CHLORIDE: 50; 4.5; 1.49 INJECTION, SOLUTION INTRAVENOUS at 05:05

## 2017-05-10 NOTE — NURSING TRANSFER
Nursing Transfer Note      5/10/2017     Transfer To: [id number]    Transfer via wheelchair    Transfer with cardiac monitoring    Transported by b crews,rn    Medicines sent: no    Chart send with patient: Yes    Notified: spouse    Patient reassessed at: 05/10/17.1800 (date, time)    Upon arrival to floor: cardiac monitor applied

## 2017-05-10 NOTE — PROGRESS NOTES
"[facility]  Endocrinology  Progress Note    Admit Date: 5/8/2017     Reason for Consult: Management of  Hyperglycemia     Surgical Procedure and Date: 5/8/2017 REPLACEMENT-VALVE-AORTIC and REPLACEMENT-VALVE-MITRAL    HPI: Patient is a 42 y.o. 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 + CURIEL that has forced her to quit working 4 months ago. She is now s/p AVR and MVR with stress induced hyperglycemia. Endocrine consulted for BG Management.         Interval HPI:   No insulin needs, tolerating 25% of diet, no hypoglycemia    /71  Pulse 65  Temp 97.8 °F (36.6 °C)  Resp 20  Ht 5' 2" (1.575 m)  Wt 73.3 kg (161 lb 9.6 oz)  LMP  (Within Months)  SpO2 100%  Breastfeeding? No  BMI 29.56 kg/m2    Labs Reviewed and Include      Recent Labs  Lab 05/09/17  1724 05/10/17  0330   * 162*   CALCIUM 8.7 8.6*   ALBUMIN 4.1  --    PROT 6.3  --     134*   K 4.0 4.2   CO2 19* 18*    108   BUN 7 10   CREATININE 0.7 0.7   ALKPHOS 35*  --    ALT 11  --    AST 48*  --    BILITOT 1.1*  --      Lab Results   Component Value Date    WBC 14.74 (H) 05/10/2017    HGB 12.2 05/10/2017    HCT 36.4 (L) 05/10/2017    MCV 91 05/10/2017     (L) 05/10/2017     No results for input(s): TSH, FREET4 in the last 168 hours.  Lab Results   Component Value Date    HGBA1C 5.3 05/04/2017       Nutritional status:   Body mass index is 29.56 kg/(m^2).  Lab Results   Component Value Date    ALBUMIN 4.1 05/09/2017    ALBUMIN 4.4 05/04/2017    ALBUMIN 4.6 03/07/2016     No results found for: PREALBUMIN    Estimated Creatinine Clearance: 97.2 mL/min (based on Cr of 0.7).    Accu-Checks  Recent Labs      05/09/17   0133  05/09/17   0219  05/09/17   0325  05/09/17   0406  05/09/17   0522  05/09/17   0608  05/09/17   0740  05/09/17   1049  05/09/17   1728  05/10/17   0745   POCTGLUCOSE  126*  122*  103  129*  125*  124*  160*  153*  129*  176*       Current " Medications and/or Treatments Impacting Glycemic Control  Immunotherapy:  Immunosuppressants     None        Steroids:   Hormones     None        Pressors:    Autonomic Drugs     None        Hyperglycemia/Diabetes Medications: Antihyperglycemics     Start     Stop Route Frequency Ordered    05/09/17 0821  insulin aspart pen 0-5 Units      -- SubQ Before meals & nightly PRN 05/09/17 0722          ASSESSMENT and PLAN    Stress hyperglycemia  Resolving  Will sign off as no insulin needs  Please re-consult for changes      * S/P AVR (aortic valve replacement)  Managed per CTS.       History of MI (myocardial infarction)  Avoid hypoglycemia.       Rheumatic aortic stenosis  Now s/p AVR       Mitral valve stenosis, rheumatic  Now s/p MVR     S/P MVR (mitral valve replacement)  Managed per CTS.         Elise Verde, SANDOR, NP  Endocrinology  [facility]

## 2017-05-10 NOTE — SUBJECTIVE & OBJECTIVE
"Interval HPI:   No insulin needs, tolerating 25% of diet, no hypoglycemia    /71  Pulse 65  Temp 97.8 °F (36.6 °C)  Resp 20  Ht 5' 2" (1.575 m)  Wt 73.3 kg (161 lb 9.6 oz)  LMP  (Within Months)  SpO2 100%  Breastfeeding? No  BMI 29.56 kg/m2    Labs Reviewed and Include      Recent Labs  Lab 05/09/17  1724 05/10/17  0330   * 162*   CALCIUM 8.7 8.6*   ALBUMIN 4.1  --    PROT 6.3  --     134*   K 4.0 4.2   CO2 19* 18*    108   BUN 7 10   CREATININE 0.7 0.7   ALKPHOS 35*  --    ALT 11  --    AST 48*  --    BILITOT 1.1*  --      Lab Results   Component Value Date    WBC 14.74 (H) 05/10/2017    HGB 12.2 05/10/2017    HCT 36.4 (L) 05/10/2017    MCV 91 05/10/2017     (L) 05/10/2017     No results for input(s): TSH, FREET4 in the last 168 hours.  Lab Results   Component Value Date    HGBA1C 5.3 05/04/2017       Nutritional status:   Body mass index is 29.56 kg/(m^2).  Lab Results   Component Value Date    ALBUMIN 4.1 05/09/2017    ALBUMIN 4.4 05/04/2017    ALBUMIN 4.6 03/07/2016     No results found for: PREALBUMIN    Estimated Creatinine Clearance: 97.2 mL/min (based on Cr of 0.7).    Accu-Checks  Recent Labs      05/09/17   0133  05/09/17   0219  05/09/17   0325  05/09/17   0406  05/09/17   0522  05/09/17   0608  05/09/17   0740  05/09/17   1049  05/09/17   1728  05/10/17   0745   POCTGLUCOSE  126*  122*  103  129*  125*  124*  160*  153*  129*  176*       Current Medications and/or Treatments Impacting Glycemic Control  Immunotherapy:  Immunosuppressants     None        Steroids:   Hormones     None        Pressors:    Autonomic Drugs     None        Hyperglycemia/Diabetes Medications: Antihyperglycemics     Start     Stop Route Frequency Ordered    05/09/17 0821  insulin aspart pen 0-5 Units      -- SubQ Before meals & nightly PRN 05/09/17 0722        "

## 2017-05-10 NOTE — PLAN OF CARE
S/P AVR, MVR([facility] mechancial) on 5/8/17. CM visited with pt and Marilyn/amelia [phone] cell. Daughter was informant for dc assessment. CM informed her that pt will dc home with coumadin due to mechanical valve. CM asked daughter for pt's cardiologist. Daughter states pt sees Dr. Sav Milian of [facility]. CM offered HH. Daughter states she doesn't think pt will need it and states pt will have ride to [facility] for coumadin monitoring. No dc needs noted at this time. Will cont to follow.        05/10/17 1410   Discharge Assessment   Assessment Type Discharge Planning Assessment   Confirmed/corrected address and phone number on facesheet? Yes   Assessment information obtained from? Caregiver   Expected Length of Stay (days) 5   Communicated expected length of stay with patient/caregiver yes   Prior to hospitilization cognitive status: Alert/Oriented   Prior to hospitalization functional status: Independent   Current cognitive status: Alert/Oriented   Current Functional Status: Independent   Arrived From admitted as an inpatient   Able to Return to Prior Arrangements yes   Is patient able to care for self after discharge? Yes   Who are your caregiver(s) and their phone number(s)? Marilyn/daughter [phone] cell    Patient's perception of discharge disposition admitted as an inpatient   Readmission Within The Last 30 Days no previous admission in last 30 days   Patient currently being followed by outpatient case management? No   Patient currently receives home health services? No   Does the patient currently use HME? No   Patient currently receives private duty nursing? No   Patient currently receives any other outside agency services? No   Equipment Currently Used at Home none   Do you have any problems affording any of your prescribed medications? No   Is the patient taking medications as prescribed? yes   Do you have any financial concerns preventing you from receiving the healthcare you need? No    Does the patient have transportation to healthcare appointments? Yes   Transportation Available family or friend will provide   On Dialysis? No   Does the patient receive services at the Coumadin Clinic? No   Discharge Plan A Home with family   Discharge Plan B Home with family;Home Health   Patient/Family In Agreement With Plan yes

## 2017-05-10 NOTE — PLAN OF CARE
Problem: Patient Care Overview  Goal: Individualization & Mutuality  Dx: MVR/AVR    Hx: tobacco use, likely history of MI (2008), rheumatic Heart disease with with mixed MS/MR    5/8- MVR/AVR   5/9: Extubated. New onset A-fib. Lopressor x 1. Amiodarone drip. Heparin drip and coumadin given.  Outcome: Ongoing (interventions implemented as appropriate)  Plan of care reviewed with daughter, who translates, and pt.  Questions answered and emotional support offered.  A-fib with lopressor x1 given and amio drip started.  Sinus rhythm with PAC's.  Heparin drip infusing at 600u/hr and coumadin given with ptt goal of 60-80 noted.  Has history of gastric reflux.  Multiple meds given with moderate relief.  Pain med changed to tramadol related to c/o increased irritation of gastric reflux.  Minimal output from MST.  Good uop.  Refer to [facility] for assessment.

## 2017-05-10 NOTE — PROGRESS NOTES
Pt to be stepped down to CSU per MD orders. Report received from CARMEN Kingsley of SICU.  Telemetry room notified.  Awaiting pt arrival.

## 2017-05-10 NOTE — PROGRESS NOTES
Progress Note  Surgical Intensive Care    SUBJECTIVE:     Chief Complaint/Reason for Admission: aortic stenosis    History of Present Illness:  Patient is a 42 y.o. female with tobacco use, likely history of MI, rheumatic HD with with mixed MS/MR and AS/AI, history of ? Cardiac arrest who has CURIEL, syncope and angina. Patient is admitted for AVR and MVR 5/8/17. Patinent transferred to ICU post-op intubated on amicar, epinephrine, Cardene, and insulin.     Interval History:  NAEON.  No issues since extubation    PTA Medications   Medication Sig    metoprolol tartrate (LOPRESSOR) 25 MG tablet Take 25 mg by mouth 2 (two) times daily.    ferrous sulfate 325 (65 FE) MG EC tablet Take 325 mg by mouth 3 (three) times daily with meals.       Review of patient's allergies indicates:  No Known Allergies    Past Medical History:   Diagnosis Date    Aortic stenosis     Bleeding ulcer     Iron deficiency anemia     Mitral stenosis     Myocardial infarction     about 2 years ago per patient     Past Surgical History:   Procedure Laterality Date    MANDIBLE FRACTURE SURGERY       Family History   Problem Relation Age of Onset    No Known Problems Mother     No Known Problems Father      Social History   Substance Use Topics    Smoking status: Former Smoker     Packs/day: 0.00     Types: Cigarettes    Smokeless tobacco: Former User     Quit date: 4/1/2016      Comment: 2 to 3 cigarettes per week    Alcohol use No        Review of Systems:  Negative except mentioned in HPI     OBJECTIVE:     Vital Signs (Most Recent)  Temp: 97.8 °F (36.6 °C) (05/10/17 0700)  Pulse: 65 (05/10/17 0745)  Resp: 20 (05/10/17 0600)  BP: 112/71 (05/09/17 1815)  SpO2: 100 % (05/10/17 0745)    Physical Exam:  General: NAD, well developed, well nourished  Eyes:  conjunctivae/corneas clear. PERRL.  Throat: limited visualization due to endotracheal tube  Neck: supple, symmetrical, trachea midline, no JVD, thyroid not enlarged, symmetric, no  tenderness/mass/nodules and R IJ CVC in place  Lungs:  clear to auscultation bilaterally and mechanical ventilation  Chest Wall: chest tubes in place, dressing clean dry and in tact  Heart: regular rate and rhythm  Abdomen: soft, non-tender non-distented; bowel sounds normal  Extremities: no cyanosis or edema, or clubbing  Neurologic: AAOx3    Lines/Drains:       Peripheral IV - Single Lumen 05/08/17 0550 Right Hand (Active)   Site Assessment Clean;Dry;Intact;No redness;No swelling 5/8/2017  6:01 AM   Line Status Blood return noted;Flushed;Infusing 5/8/2017  6:01 AM   Dressing Status Clean;Dry;Intact 5/8/2017  6:01 AM   Dressing Intervention New dressing 5/8/2017  6:01 AM   Number of days:0            Arterial Line 05/08/17 0710 Left Radial (Active)   Number of days:0            Urethral Catheter 05/08/17 0724 Temperature probe;Straight-tip;Non-latex 16 Fr. (Active)   Output (mL) 150 mL 5/8/2017  2:00 PM   Number of days:0            Y Chest Tube 1 and 2 05/08/17 1239 Mediastinal 19 Fr. Mediastinal 19 Fr. (Active)   Output (mL) 25 mL 5/8/2017  2:00 PM   Number of days:0       Laboratory  CBC:     Recent Labs  Lab 05/10/17  0330   WBC 14.74*   RBC 4.02   HGB 12.2   HCT 36.4*   *   MCV 91   MCH 30.3   MCHC 33.5     CMP:     Recent Labs  Lab 05/09/17  1724 05/10/17  0330   * 162*   CALCIUM 8.7 8.6*   ALBUMIN 4.1  --    PROT 6.3  --     134*   K 4.0 4.2   CO2 19* 18*    108   BUN 7 10   CREATININE 0.7 0.7   ALKPHOS 35*  --    ALT 11  --    AST 48*  --    BILITOT 1.1*  --      Coagulation:     Recent Labs  Lab 05/09/17  0400  05/10/17  0739   INR 1.2  --   --    APTT 29.5  < > 55.1*   < > = values in this interval not displayed.  Cardiac markers: No results for input(s): CKMB, CPKMB, TROPONINT, TROPONINI, MYOGLOBIN in the last 168 hours.  ABGs:     Recent Labs  Lab 05/09/17  0357   PH 7.379   PCO2 28.7*   PO2 139*   HCO3 17.0*   POCSATURATED 99   BE -8       Chest X-Ray: Tubes and lines are  appropriate.  There is borderline cardiomegaly postoperative change and mild perihilar edema.      ASSESSMENT/PLAN:     43 y/o woman with mixed MS/MR and AS/AI who is s/p AVR and MVR on 5/8.      Plan:  Neuro:   - morphine and oxy prn pain    Pulmonary:   - No issues since extubation  - Wean supplemental O2    Cardiac:  - s/p AVR and MVR   - off levo and epi  - cardene gtt for MAPs >80  - ASA 325mg  - HDS    Renal:   - BUN/Cr: 10/0.7  - monitor I/O's    Infectious Disease:   - Afebrile, WBC decreasing to 14    Hematology/Oncology:  - H/H 12/36  - monitor with daily CBC  - Transfuse per primary     Endocrine:  - monitor glucose  - insulin gtt    Fluids/Electrolytes/Nutrition/GI:   - D5 1/2 NS KCl @ 25ml/hr  - Diet per CTS    Pain Management:   Oxy and morphine prn    Dispo: Stepdown orders in, awaiting bed       Hank Campbell M.D.  General Surgery PGY1  [phone]

## 2017-05-10 NOTE — PROGRESS NOTES
Progress Note  Cardiothoracic Surgery    Admit Date: 5/8/2017  Post-operative Day: 2 Days Post-Op  Hospital Day: 3    SUBJECTIVE:     Follow-up For: Procedure(s) (LRB):  REPLACEMENT-VALVE-AORTIC (N/A)  REPLACEMENT-VALVE-MITRAL (N/A)    Went into a fib, converted out with amio  Otherwise no major issues    Scheduled Meds:   amlodipine  5 mg Oral Daily    ascorbic acid (vitamin C)  500 mg Oral BID    aspirin  81 mg Oral Daily    famotidine (PF)  20 mg Intravenous BID    ferrous sulfate  325 mg Oral Daily    metoprolol  5 mg Intravenous Once    metoprolol tartrate  25 mg Oral TID    mupirocin  1 g Nasal BID    pantoprazole  40 mg Oral Daily    senna-docusate 8.6-50 mg  1 tablet Oral BID    warfarin  5 mg Oral Daily     Infusions/Drips:   amiodarone 0.5 mg/min (05/10/17 0700)    dextrose 5 % and 0.45 % NaCl with KCl 20 mEq 25 mL/hr at 05/10/17 0700    heparin (porcine) in 5 % dex 700 Units/hr (05/10/17 0842)     PRN Meds: acetaminophen, albuterol-ipratropium 2.5mg-0.5mg/3mL, calcium carbonate, dextrose 50%, dextrose 50%, glucagon (human recombinant), glucose, glucose, hydrALAZINE, insulin aspart, magnesium sulfate IVPB, morphine, ondansetron, potassium chloride **AND** potassium chloride **AND** potassium chloride, sodium phosphate IVPB, sodium phosphate IVPB, sodium phosphate IVPB, tramadol, tramadol    Review of patient's allergies indicates:  No Known Allergies    OBJECTIVE:     Vital Signs (Most Recent)  Temp: 97.8 °F (36.6 °C) (05/10/17 0700)  Pulse: 65 (05/10/17 0745)  Resp: 20 (05/10/17 0600)  BP: 112/71 (05/09/17 1815)  SpO2: 100 % (05/10/17 0745)    Admission Weight: 69.4 kg (153 lb) (05/08/17 0542)   Most Recent Weight: 73.3 kg (161 lb 9.6 oz) (05/10/17 0457)    Vital Signs Range (Last 24H):  Temp:  [97.8 °F (36.6 °C)-99.8 °F (37.7 °C)]   Pulse:  [56-98]   Resp:  [16-32]   BP: (112-125)/(68-71)   SpO2:  [99 %-100 %]   Arterial Line BP: ()/(56-68)     I & O (Last 24H):  Intake/Output Summary  (Last 24 hours) at 05/10/17 1120  Last data filed at 05/10/17 0700   Gross per 24 hour   Intake             2024 ml   Output             1835 ml   Net              189 ml     Physical Exam:  NAD  CTAB  Sternum stable  RRR this AM  Ext wwp    Wound/Incision:  clean, dry, intact    Laboratory:  CBC:   Recent Labs  Lab 05/10/17  0330   WBC 14.74*   RBC 4.02   HGB 12.2   HCT 36.4*   *   MCV 91   MCH 30.3   MCHC 33.5     BMP:   Recent Labs  Lab 05/10/17  0330   *   *   K 4.2      CO2 18*   BUN 10   CREATININE 0.7   CALCIUM 8.6*   MG 2.3     CMP:   Recent Labs  Lab 05/09/17  1724 05/10/17  0330   * 162*   CALCIUM 8.7 8.6*   ALBUMIN 4.1  --    PROT 6.3  --     134*   K 4.0 4.2   CO2 19* 18*    108   BUN 7 10   CREATININE 0.7 0.7   ALKPHOS 35*  --    ALT 11  --    AST 48*  --    BILITOT 1.1*  --      LFTs:   Recent Labs  Lab 05/09/17  1724   ALT 11   AST 48*   ALKPHOS 35*   BILITOT 1.1*   PROT 6.3   ALBUMIN 4.1     Coagulation:   Recent Labs  Lab 05/10/17  0739   INR 1.9*   APTT 55.1*       Diagnostic Results:  ECG: Reviewed  X-Ray: Reviewed    ASSESSMENT/PLAN:     Assessment: Ms Ibarra is a 42 y.o. female with RHD with moderate aortic and mitral stenosis s/p REPLACEMENT-VALVE-AORTIC (N/A), REPLACEMENT-VALVE-MITRAL (N/A) -- both mechanically 2 Days Post-Op    Plan:   Neuro: alert, oriented x3; PRN pain medication    CV: hemodynamically stable  S/p aortic and mitral mechanical valves  Aspirin  Heparin PTT 60-80  Coumadin 3 tonight, INR goal 2.5-3.5  Beta blocker to tid, continue amio - convert to PO tomorrow  Lasix    Pulm: No results for input(s): PH, PCO2, PO2, HCO3, POCSATURATED, BE in the last 24 hours.  Aggressive pulmonary toilet, continue chest tubes to suction    Renal:   Recent Labs      05/10/17   0330   BUN  10   CREATININE  0.7     Will trend BUN/CR, follow UOP, fluids if indicated, quiñonez d/c    FEN/GI: cardiac diet, will monitor and replace lytes PRN    HEME/ID:    Recent Labs      05/10/17   0330   HGB  12.2   HCT  36.4*   WBC  14.74*    will trend,   abx completed post op course    Endocrine: goal glucose 120-150; endocrine assistance appreciated    DVT on heparin gtt  GI not indicated  PT/OT    Lines: d/c arterial, central, and quiñonez    Dispo: step down today

## 2017-05-11 PROBLEM — I48.0 PAROXYSMAL A-FIB: Status: ACTIVE | Noted: 2017-05-11

## 2017-05-11 PROBLEM — E83.39 HYPOPHOSPHATEMIA: Status: ACTIVE | Noted: 2017-05-11

## 2017-05-11 LAB
ANION GAP SERPL CALC-SCNC: 7 MMOL/L
APTT BLDCRRT: 64.1 SEC
APTT BLDCRRT: 65.7 SEC
APTT BLDCRRT: 75.5 SEC
BASOPHILS # BLD AUTO: 0.03 K/UL
BASOPHILS NFR BLD: 0.3 %
BUN SERPL-MCNC: 10 MG/DL
CALCIUM SERPL-MCNC: 8.3 MG/DL
CHLORIDE SERPL-SCNC: 103 MMOL/L
CO2 SERPL-SCNC: 24 MMOL/L
CREAT SERPL-MCNC: 0.7 MG/DL
DIFFERENTIAL METHOD: ABNORMAL
EOSINOPHIL # BLD AUTO: 0.1 K/UL
EOSINOPHIL NFR BLD: 0.6 %
ERYTHROCYTE [DISTWIDTH] IN BLOOD BY AUTOMATED COUNT: 13.1 %
EST. GFR  (AFRICAN AMERICAN): >60 ML/MIN/1.73 M^2
EST. GFR  (NON AFRICAN AMERICAN): >60 ML/MIN/1.73 M^2
GLUCOSE SERPL-MCNC: 123 MG/DL
HCT VFR BLD AUTO: 33.3 %
HGB BLD-MCNC: 11.1 G/DL
INR PPP: 5.2
LYMPHOCYTES # BLD AUTO: 1.6 K/UL
LYMPHOCYTES NFR BLD: 18.1 %
MAGNESIUM SERPL-MCNC: 2.1 MG/DL
MCH RBC QN AUTO: 30.2 PG
MCHC RBC AUTO-ENTMCNC: 33.3 %
MCV RBC AUTO: 91 FL
MONOCYTES # BLD AUTO: 1.1 K/UL
MONOCYTES NFR BLD: 12.8 %
NEUTROPHILS # BLD AUTO: 6 K/UL
NEUTROPHILS NFR BLD: 68 %
PHOSPHATE SERPL-MCNC: 1.5 MG/DL
PLATELET # BLD AUTO: 129 K/UL
PMV BLD AUTO: 10.7 FL
POCT GLUCOSE: 123 MG/DL (ref 70–110)
POCT GLUCOSE: 126 MG/DL (ref 70–110)
POCT GLUCOSE: 131 MG/DL (ref 70–110)
POCT GLUCOSE: 133 MG/DL (ref 70–110)
POTASSIUM SERPL-SCNC: 4.2 MMOL/L
PROTHROMBIN TIME: 53.4 SEC
RBC # BLD AUTO: 3.67 M/UL
SODIUM SERPL-SCNC: 134 MMOL/L
WBC # BLD AUTO: 8.82 K/UL

## 2017-05-11 PROCEDURE — 85025 COMPLETE CBC W/AUTO DIFF WBC: CPT

## 2017-05-11 PROCEDURE — 63600175 PHARM REV CODE 636 W HCPCS: Performed by: STUDENT IN AN ORGANIZED HEALTH CARE EDUCATION/TRAINING PROGRAM

## 2017-05-11 PROCEDURE — 80048 BASIC METABOLIC PNL TOTAL CA: CPT

## 2017-05-11 PROCEDURE — 63600175 PHARM REV CODE 636 W HCPCS

## 2017-05-11 PROCEDURE — 83735 ASSAY OF MAGNESIUM: CPT

## 2017-05-11 PROCEDURE — 97530 THERAPEUTIC ACTIVITIES: CPT

## 2017-05-11 PROCEDURE — 25000003 PHARM REV CODE 250: Performed by: NURSE PRACTITIONER

## 2017-05-11 PROCEDURE — 85730 THROMBOPLASTIN TIME PARTIAL: CPT | Mod: 91

## 2017-05-11 PROCEDURE — 97116 GAIT TRAINING THERAPY: CPT

## 2017-05-11 PROCEDURE — 93010 ELECTROCARDIOGRAM REPORT: CPT | Mod: ,,, | Performed by: INTERNAL MEDICINE

## 2017-05-11 PROCEDURE — 93005 ELECTROCARDIOGRAM TRACING: CPT

## 2017-05-11 PROCEDURE — 84100 ASSAY OF PHOSPHORUS: CPT

## 2017-05-11 PROCEDURE — 25000003 PHARM REV CODE 250

## 2017-05-11 PROCEDURE — 85610 PROTHROMBIN TIME: CPT

## 2017-05-11 PROCEDURE — 63600175 PHARM REV CODE 636 W HCPCS: Performed by: NURSE PRACTITIONER

## 2017-05-11 PROCEDURE — 20600001 HC STEP DOWN PRIVATE ROOM

## 2017-05-11 RX ORDER — FUROSEMIDE 10 MG/ML
40 INJECTION INTRAMUSCULAR; INTRAVENOUS 3 TIMES DAILY
Status: DISCONTINUED | OUTPATIENT
Start: 2017-05-11 | End: 2017-05-13

## 2017-05-11 RX ORDER — AMIODARONE HYDROCHLORIDE 200 MG/1
400 TABLET ORAL 2 TIMES DAILY
Status: DISCONTINUED | OUTPATIENT
Start: 2017-05-11 | End: 2017-05-13 | Stop reason: HOSPADM

## 2017-05-11 RX ORDER — METOPROLOL TARTRATE 25 MG/1
25 TABLET, FILM COATED ORAL 2 TIMES DAILY
Status: DISCONTINUED | OUTPATIENT
Start: 2017-05-11 | End: 2017-05-13 | Stop reason: HOSPADM

## 2017-05-11 RX ORDER — SODIUM,POTASSIUM PHOSPHATES 280-250MG
1 POWDER IN PACKET (EA) ORAL
Status: DISCONTINUED | OUTPATIENT
Start: 2017-05-11 | End: 2017-05-13 | Stop reason: HOSPADM

## 2017-05-11 RX ADMIN — Medication 500 MG: at 08:05

## 2017-05-11 RX ADMIN — FERROUS SULFATE TAB EC 325 MG (65 MG FE EQUIVALENT) 325 MG: 325 (65 FE) TABLET DELAYED RESPONSE at 08:05

## 2017-05-11 RX ADMIN — FUROSEMIDE 40 MG: 10 INJECTION, SOLUTION INTRAVENOUS at 01:05

## 2017-05-11 RX ADMIN — FAMOTIDINE 20 MG: 20 TABLET, FILM COATED ORAL at 08:05

## 2017-05-11 RX ADMIN — METOPROLOL TARTRATE 25 MG: 25 TABLET ORAL at 09:05

## 2017-05-11 RX ADMIN — STANDARDIZED SENNA CONCENTRATE AND DOCUSATE SODIUM 1 TABLET: 8.6; 5 TABLET, FILM COATED ORAL at 08:05

## 2017-05-11 RX ADMIN — POTASSIUM & SODIUM PHOSPHATES POWDER PACK 280-160-250 MG 1 PACKET: 280-160-250 PACK at 04:05

## 2017-05-11 RX ADMIN — AMLODIPINE BESYLATE 5 MG: 5 TABLET ORAL at 08:05

## 2017-05-11 RX ADMIN — FUROSEMIDE 40 MG: 10 INJECTION, SOLUTION INTRAVENOUS at 09:05

## 2017-05-11 RX ADMIN — ASPIRIN 81 MG CHEWABLE TABLET 81 MG: 81 TABLET CHEWABLE at 08:05

## 2017-05-11 RX ADMIN — METOPROLOL TARTRATE 25 MG: 25 TABLET ORAL at 05:05

## 2017-05-11 RX ADMIN — STANDARDIZED SENNA CONCENTRATE AND DOCUSATE SODIUM 1 TABLET: 8.6; 5 TABLET, FILM COATED ORAL at 09:05

## 2017-05-11 RX ADMIN — Medication 1 CAPSULE: at 11:05

## 2017-05-11 RX ADMIN — POTASSIUM CHLORIDE 20 MEQ: 750 CAPSULE, EXTENDED RELEASE ORAL at 08:05

## 2017-05-11 RX ADMIN — FAMOTIDINE 20 MG: 20 TABLET, FILM COATED ORAL at 09:05

## 2017-05-11 RX ADMIN — POTASSIUM PHOSPHATE, MONOBASIC AND POTASSIUM PHOSPHATE, DIBASIC 30 MMOL: 224; 236 INJECTION, SOLUTION, CONCENTRATE INTRAVENOUS at 11:05

## 2017-05-11 RX ADMIN — Medication 500 MG: at 09:05

## 2017-05-11 RX ADMIN — POTASSIUM & SODIUM PHOSPHATES POWDER PACK 280-160-250 MG 1 PACKET: 280-160-250 PACK at 09:05

## 2017-05-11 RX ADMIN — AMIODARONE HYDROCHLORIDE 400 MG: 200 TABLET ORAL at 11:05

## 2017-05-11 RX ADMIN — AMIODARONE HYDROCHLORIDE 0.5 MG/MIN: 1.8 INJECTION, SOLUTION INTRAVENOUS at 08:05

## 2017-05-11 RX ADMIN — MUPIROCIN 1 G: 20 OINTMENT TOPICAL at 08:05

## 2017-05-11 RX ADMIN — POTASSIUM & SODIUM PHOSPHATES POWDER PACK 280-160-250 MG 1 PACKET: 280-160-250 PACK at 11:05

## 2017-05-11 RX ADMIN — FUROSEMIDE 40 MG: 10 INJECTION, SOLUTION INTRAVENOUS at 08:05

## 2017-05-11 RX ADMIN — AMIODARONE HYDROCHLORIDE 400 MG: 200 TABLET ORAL at 09:05

## 2017-05-11 NOTE — PLAN OF CARE
Problem: Patient Care Overview  Goal: Plan of Care Review  Outcome: Ongoing (interventions implemented as appropriate)  Plan of care reviewed with patient and patient's daughter. Patient stable throughout shift. O2 sats> 92% on room air.   Patient in AFib throughout shift, asymptomatic. Amiodarone gtt will be discontinued once current IV bag infusion is complete.  Minimal drainage noted from chest tube, no complications noted.  Patient tolerating cardiac diet. Up to bathroom and ambulating in room with daughter's assistance.     Patient remains free from falls and no s/s of skin breakdown.  See MAR and Flowsheets for further details.

## 2017-05-11 NOTE — PT/OT/SLP PROGRESS
Physical Therapy  Treatment    Rea Ibarra   MRN: [id number]   Admitting Diagnosis: S/P AVR (aortic valve replacement)    PT Received On: 17  PT Start Time: 45     PT Stop Time: 915    PT Total Time (min): 30 min       Billable Minutes:  Gait [id number] and Therapeutic Activity 17    Treatment Type: Treatment  PT/PTA: PTA     PTA Visit Number: 1       General Precautions: Standard, fall, sternal  Orthopedic Precautions: N/A     Do you have any cultural, spiritual, Tenriism conflicts, given your current situation?: none noted     Subjective:  Communicated with RN prior to session.  Pt agreeable to PT.    Pain Ratin/10     Location - Orientation: generalized     Pain Addressed: Pre-medicate for activity, Distraction  Pain Rating Post-Intervention: 4/10    Objective:   Patient found with: central line, chest tube, telemetry    Functional Mobility:  Bed Mobility:   Rolling/Turning to Left: Contact guard assistance  Supine to Sit: Minimum Assistance  Sit to Supine: Minimum Assistance    Transfers:  Sit <> Stand Assistance: Stand By Assistance  Sit <> Stand Assistive Device: No Assistive Device    Gait:   Gait Distance: 400 feet  Assistance 1: Stand by Assistance  Gait Assistive Device: No device  Gait Pattern: reciprocal  Gait Deviation(s):  (none noted)    Therapeutic Activities and Exercises:  Sternal precautions were reviewed with pt and reinforced during functional mobility.  Pt was given written sternal precautions in [place] sor[place].  Pt was educated in the use of IS.    AM-PAC 6 CLICK MOBILITY  How much help from another person does this patient currently need?   1 = Unable, Total/Dependent Assistance  2 = A lot, Maximum/Moderate Assistance  3 = A little, Minimum/Contact Guard/Supervision  4 = None, Modified [place]/Independent    Turning over in bed (including adjusting bedclothes, sheets and blankets)?: 3  Sitting down on and standing up from a chair with arms (e.g., wheelchair, bedside commode,  etc.): 4  Moving from lying on back to sitting on the side of the bed?: 3  Moving to and from a bed to a chair (including a wheelchair)?: 4  Need to walk in hospital room?: 4  Climbing 3-5 steps with a railing?: 3  Total Score: 21    AM-PAC Raw Score CMS G-Code Modifier Level of Impairment Assistance   6 % Total / Unable   7 - 9 CM 80 - 100% Maximal Assist   10 - 14 CL 60 - 80% Moderate Assist   15 - 19 CK 40 - 60% Moderate Assist   20 - 22 CJ 20 - 40% Minimal Assist   23 CI 1-20% SBA / CGA   24 CH 0% Independent/ Mod I     Patient left supine with all lines intact, call button in reach, RN notified and daughter present.    Assessment:  Rea Ibarra is a 42 y.o. female with a medical diagnosis of S/P AVR (aortic valve replacement) and presents with decreased functional mobility and impairments as listed below.    Rehab identified problem list/impairments: Rehab identified problem list/impairments: weakness, impaired endurance, impaired functional mobilty, impaired self care skills, pain, impaired cardiopulmonary response to activity    Rehab potential is good.    Activity tolerance: Good    Discharge recommendations: Discharge Facility/Level Of Care Needs: home with home health     Barriers to discharge: Barriers to Discharge: Inaccessible home environment, Decreased caregiver support    Equipment recommendations: Equipment Needed After Discharge: none     GOALS:   Physical Therapy Goals        Problem: Physical Therapy Goal    Goal Priority Disciplines Outcome Goal Variances Interventions   Physical Therapy Goal     PT/OT, PT Ongoing (interventions implemented as appropriate)     Description:  Goals to be met by: 17      Patient will increase functional independence with mobility by performin. Supine to sit with MInimal Assistance - met  2. Sit to supine with MInimal Assistance - met  3. Rolling to Left and Right with Minimal Assistance. - met  4. Sit to stand transfer with Supervision- not  met  5. Bed to chair transfer with Supervision using no AD- not met  6. Gait  x 150 feet with Stand-by Assistance using no AD. -met  7. Lower extremity exercise program x15 reps per handout, with assistance as needed- not met                 PLAN:    Patient to be seen 5 x/week  to address the above listed problems via gait training, therapeutic activities, therapeutic exercises  Plan of Care expires: 06/06/17  Plan of Care reviewed with: patient, daughter    Haley Marsh, PTA  05/11/2017

## 2017-05-11 NOTE — PLAN OF CARE
Problem: Physical Therapy Goal  Goal: Physical Therapy Goal  Goals to be met by: 17     Patient will increase functional independence with mobility by performin. Supine to sit with MInimal Assistance - met  2. Sit to supine with MInimal Assistance - met  3. Rolling to Left and Right with Minimal Assistance. - met  4. Sit to stand transfer with Supervision- not met  5. Bed to chair transfer with Supervision using no AD- not met  6. Gait x 150 feet with Stand-by Assistance using no AD. -met  7. Lower extremity exercise program x15 reps per handout, with assistance as needed- not met   Pt continues to progress toward goals and  Goals remain appropriate at this time.   Haley Marsh, PTA

## 2017-05-11 NOTE — NURSING
Received critical lab values of INR 5.2, yesterday 1.9. APTT 75.5. Per Dr. Llamas, D/C heparin for now, Coumadin therapy will be addressed in AM rounds.

## 2017-05-11 NOTE — CONSULTS
"Cardiac Rehab     Rea Ibarra   [id number]   5/11/2017       Activity taught: Yes    Cardiac Rehab Phase Taught: Phase 1    Risk Factors-Modifiable: nutrition, exercise    Risk Factors-Non modifiable: none    Teaching Method: Verbal, Written, Living with Heart Disease    Understanding: Yes    Response: Patient and daughter verbalized understanding and question answered. She live in [place] and will need transportation to [facility] for cardiac rehab. Daughter says it is unlikely she will go because of transportation and fees for the program. An at home exercise program will be good for her.    Comments: S/P AVR + MVR. Discussed cardiac rehab and risk factor modification. Team to refer patient to [facility] Phase II. Educational materials were used in the process and given to the patient. They included "Your Guide to Living with Heart Disease", Phase Two Cardiac Rehabilitation information along with a sample Mediterranean diet.The patient expressed understanding of the teaching and expressed desire to take a role in modifying the risk factors when they return home.        "

## 2017-05-11 NOTE — PHYSICIAN QUERY
"PT Name: Rea Ibarra  MR #: [id number]    Physician Query Form - Heart  Condition Clarification     CDS/: Eula Montes RN CCDS        Contact information:  [email or web address]  This form is a permanent document in the medical record.     Query Date: May 11, 2017    By submitting this query, we are merely seeking further clarification of documentation. Please utilize your independent clinical judgment when addressing the question(s) below.    The medical record contains the following   Indicators     Supporting Clinical Findings Location in Medical Record    BNP     X EF EF 60%, nml systolic, abnormal diastolic 5/4 h/p    Radiology findings      Echo Results      "Ascites" documented     X "SOB" or "CURIEL" documented CURIEL 5/4 h/p    "Hypoxia" documented     X Heart Failure documented Class III Heart Failure  5/8 Op note     "Edema" documented     X Diuretics/Meds Furosemide 40mg IV 3 x a day  Metoprolol 25 mg PO 2 x a day mar    Treatment:     X Other:  RHD with moderate aortic and mitral stenosis  AVR, MVR  5/4 h/p  5/8 op note        Provider, please specify diagnosis or diagnoses associated with above clinical findings.                               [ X ] Chronic Diastolic Heart Failure (EF > 40)*  [  ] Other Type of Heart Failure (please specify type): _________________________  [  ] Heart Failure Ruled Out  [  ] Other (please specify): ___________________________________  [  ] Clinically Undetermined            *American Heart Association                                                                                                          Please document in your progress notes daily for the duration of treatment until resolved and include in your discharge summary.    " gynecomastia yes Residence called and disposition discussed

## 2017-05-11 NOTE — PROGRESS NOTES
Progress Note  Cardiothoracic Surgery    Admit Date: 5/8/2017  Post-operative Day: 3 Days Post-Op  Hospital Day: 4    SUBJECTIVE: no acute events overnight. NSR on monitor     Follow-up For: Procedure(s) (LRB):  REPLACEMENT-VALVE-AORTIC (N/A)  REPLACEMENT-VALVE-MITRAL (N/A)    Scheduled Meds:   amiodarone  400 mg Oral BID    ascorbic acid (vitamin C)  500 mg Oral BID    aspirin  81 mg Oral Daily    famotidine  20 mg Oral BID    ferrous sulfate  325 mg Oral Daily    furosemide  40 mg Intravenous TID    metoprolol  5 mg Intravenous Once    metoprolol tartrate  25 mg Oral BID    omega-3 fatty acids-fish oil  1 capsule Oral Daily    potassium phosphate IVPB  30 mmol Intravenous Once    potassium, sodium phosphates  1 packet Oral QID (WM & HS)    senna-docusate 8.6-50 mg  1 tablet Oral BID     Infusions/Drips:   amiodarone 0.5 mg/min (05/11/17 1200)     PRN Meds: acetaminophen, albuterol-ipratropium 2.5mg-0.5mg/3mL, calcium carbonate, dextrose 50%, dextrose 50%, glucagon (human recombinant), glucose, glucose, hydrALAZINE, insulin aspart, morphine, ondansetron, tramadol, tramadol    Review of patient's allergies indicates:  No Known Allergies    OBJECTIVE:     Vital Signs (Most Recent)  Temp: 98.4 °F (36.9 °C) (05/11/17 1200)  Pulse: 69 (05/11/17 1200)  Resp: 16 (05/11/17 1200)  BP: (!) 87/59 (05/11/17 1214)  SpO2: (!) 93 % (05/11/17 1200)    Admission Weight: 69.4 kg (153 lb) (05/08/17 0542)   Most Recent Weight: 73.3 kg (161 lb 9.6 oz) (05/10/17 0457)    Vital Signs Range (Last 24H):  Temp:  [97.3 °F (36.3 °C)-98.7 °F (37.1 °C)]   Pulse:  [62-72]   Resp:  [12-16]   BP: ()/(58-74)   SpO2:  [93 %-98 %]   Arterial Line BP: ()/(50-63)     I & O (Last 24H):  Intake/Output Summary (Last 24 hours) at 05/11/17 1227  Last data filed at 05/11/17 1100   Gross per 24 hour   Intake             1042 ml   Output              510 ml   Net              532 ml     Physical Exam:  General: no distress  Lungs:   clear to auscultation bilaterally and normal respiratory effort  Chest Wall: no tenderness  Heart: regular rate and rhythm, S1, S2 normal, no murmur, rub or gallop  Abdomen: soft, non-tender   Skin: Skin color, texture, turgor normal. No rashes or lesions  Neurologic: Alert and oriented. Thought content appropriate    Wound/Incision:  clean, dry, intact    Laboratory:  CBC:   Recent Labs  Lab 05/11/17  0459   WBC 8.82   RBC 3.67*   HGB 11.1*   HCT 33.3*   *   MCV 91   MCH 30.2   MCHC 33.3     BMP:   Recent Labs  Lab 05/11/17 0459   *   *   K 4.2      CO2 24   BUN 10   CREATININE 0.7   CALCIUM 8.3*   MG 2.1       Diagnostic Results:  X-Ray: Reviewed    ASSESSMENT/PLAN:     Active Hospital Problems    Diagnosis    *S/P AVR (aortic valve replacement)    Hypophosphatemia    Paroxysmal a-fib    Encounter for weaning from ventilator    Aortic stenosis    Stress hyperglycemia    S/P MVR (mitral valve replacement)    Mitral valve stenosis, rheumatic    Rheumatic aortic stenosis    History of MI (myocardial infarction)       Plan:   Sternal precautions  PT/OT  Ambulate  Monitor electrolytes and replace prn  accuchecks QID  Daily weights  1800 anna marie ADA diet  Hypophosphatemia: replaced   Lasix increased to 40mg TID   Holding today's Coumadin for INR of 5.2   Post op Afib: transitioning to PO amiodarone   Discharge planning ongoing    RUBEN Cruz-BC  CTS

## 2017-05-11 NOTE — PLAN OF CARE
CM called [facility] [phone] and inquired about pt seeing Dr. Brown. Pt has no seen Dr. Brown since 6/2016. [facility] was able to make appt for pt for Monday, May 15, 2017 at 1:50pm. [facility] does not need to send H&P, op note, and dc summary because they have EPIC. CM will update pt and pt's daughter.

## 2017-05-11 NOTE — PLAN OF CARE
Problem: Patient Care Overview  Goal: Plan of Care Review  Outcome: Ongoing (interventions implemented as appropriate)  Spoke with the patient and her daughter regarding the plan of care. Daughter remains at the bedside assisting the patient, patient speaks very little English. Encouraged use of  services to convey important medical information. Verbalized understanding. Patient is able to tell her daughter to convey if she is in pain or not. Encouraged ambulation, turning, coughing and deep breathing. Will continue to monitor.

## 2017-05-11 NOTE — PHYSICIAN QUERY
PT Name: Rea Ibarra  MR #: [id number]    Physician Query Form - Postoperative Relationship Clarification     CDS/: Eula Montes RN CCDS        Contact information: [email or web address]    This form is a permanent document in the medical record.     Query Date: May 11, 2017    Dear Provider,    By submitting this query, we are merely seeking further clarification of documentation. Please utilize your independent clinical judgment when addressing the question(s) below.    The Medical Record contains the following:    Supporting Clinical Findings   Location in Medical Record     Went into afib, converted out with amio    Post-op A-fib , transitioning to PO amiodarone   5/10 progness note    5/11 progress note        Please clarify the term post operative   [ X ] Condition occurred in the post operative period (not a complication of surgery)   [  ] Condition is a complication of surgery   [  ] Other intended meaning (please specify) ____________________________

## 2017-05-11 NOTE — PROGRESS NOTES
DEZ Santizo NP notified of patient's BP 82/58 MAP 66. Patient asymptomatic.Notified of BP retake 87/59 MAP 69. Patient continues to be asymptomatic. Will continue to monitor.

## 2017-05-12 DIAGNOSIS — Z95.2 S/P MVR (MITRAL VALVE REPLACEMENT): ICD-10-CM

## 2017-05-12 DIAGNOSIS — Z95.2 S/P AVR (AORTIC VALVE REPLACEMENT): Primary | ICD-10-CM

## 2017-05-12 LAB
ANION GAP SERPL CALC-SCNC: 10 MMOL/L
BLD PROD TYP BPU: NORMAL
BLD PROD TYP BPU: NORMAL
BLOOD UNIT EXPIRATION DATE: NORMAL
BLOOD UNIT EXPIRATION DATE: NORMAL
BLOOD UNIT TYPE CODE: 5100
BLOOD UNIT TYPE CODE: 5100
BLOOD UNIT TYPE: NORMAL
BLOOD UNIT TYPE: NORMAL
BUN SERPL-MCNC: 12 MG/DL
CALCIUM SERPL-MCNC: 8.8 MG/DL
CHLORIDE SERPL-SCNC: 102 MMOL/L
CO2 SERPL-SCNC: 24 MMOL/L
CODING SYSTEM: NORMAL
CODING SYSTEM: NORMAL
CREAT SERPL-MCNC: 0.7 MG/DL
DISPENSE STATUS: NORMAL
DISPENSE STATUS: NORMAL
EST. GFR  (AFRICAN AMERICAN): >60 ML/MIN/1.73 M^2
EST. GFR  (NON AFRICAN AMERICAN): >60 ML/MIN/1.73 M^2
GLUCOSE SERPL-MCNC: 103 MG/DL
HCT VFR BLD AUTO: 35.8 %
HGB BLD-MCNC: 11.9 G/DL
INR PPP: 4.9
MAGNESIUM SERPL-MCNC: 2.3 MG/DL
PHOSPHATE SERPL-MCNC: 2.4 MG/DL
POCT GLUCOSE: 126 MG/DL (ref 70–110)
POTASSIUM SERPL-SCNC: 3.3 MMOL/L
PROTHROMBIN TIME: 50.7 SEC
SODIUM SERPL-SCNC: 136 MMOL/L
TRANS ERYTHROCYTES VOL PATIENT: NORMAL ML
TRANS ERYTHROCYTES VOL PATIENT: NORMAL ML

## 2017-05-12 PROCEDURE — 83735 ASSAY OF MAGNESIUM: CPT

## 2017-05-12 PROCEDURE — 63600175 PHARM REV CODE 636 W HCPCS: Performed by: NURSE PRACTITIONER

## 2017-05-12 PROCEDURE — 97530 THERAPEUTIC ACTIVITIES: CPT

## 2017-05-12 PROCEDURE — 25000003 PHARM REV CODE 250

## 2017-05-12 PROCEDURE — 84100 ASSAY OF PHOSPHORUS: CPT

## 2017-05-12 PROCEDURE — 85610 PROTHROMBIN TIME: CPT

## 2017-05-12 PROCEDURE — 85014 HEMATOCRIT: CPT

## 2017-05-12 PROCEDURE — 25000003 PHARM REV CODE 250: Performed by: NURSE PRACTITIONER

## 2017-05-12 PROCEDURE — 85018 HEMOGLOBIN: CPT

## 2017-05-12 PROCEDURE — 20600001 HC STEP DOWN PRIVATE ROOM

## 2017-05-12 PROCEDURE — 80048 BASIC METABOLIC PNL TOTAL CA: CPT

## 2017-05-12 PROCEDURE — 97116 GAIT TRAINING THERAPY: CPT

## 2017-05-12 PROCEDURE — 36415 COLL VENOUS BLD VENIPUNCTURE: CPT

## 2017-05-12 RX ADMIN — Medication 500 MG: at 08:05

## 2017-05-12 RX ADMIN — METOPROLOL TARTRATE 25 MG: 25 TABLET ORAL at 08:05

## 2017-05-12 RX ADMIN — AMIODARONE HYDROCHLORIDE 400 MG: 200 TABLET ORAL at 08:05

## 2017-05-12 RX ADMIN — FAMOTIDINE 20 MG: 20 TABLET, FILM COATED ORAL at 08:05

## 2017-05-12 RX ADMIN — FUROSEMIDE 40 MG: 10 INJECTION, SOLUTION INTRAVENOUS at 05:05

## 2017-05-12 RX ADMIN — ACETAMINOPHEN 650 MG: 325 TABLET ORAL at 01:05

## 2017-05-12 RX ADMIN — METOPROLOL TARTRATE 25 MG: 25 TABLET ORAL at 09:05

## 2017-05-12 RX ADMIN — Medication 1 CAPSULE: at 08:05

## 2017-05-12 RX ADMIN — FERROUS SULFATE TAB EC 325 MG (65 MG FE EQUIVALENT) 325 MG: 325 (65 FE) TABLET DELAYED RESPONSE at 08:05

## 2017-05-12 RX ADMIN — FAMOTIDINE 20 MG: 20 TABLET, FILM COATED ORAL at 09:05

## 2017-05-12 RX ADMIN — STANDARDIZED SENNA CONCENTRATE AND DOCUSATE SODIUM 1 TABLET: 8.6; 5 TABLET, FILM COATED ORAL at 08:05

## 2017-05-12 RX ADMIN — AMIODARONE HYDROCHLORIDE 400 MG: 200 TABLET ORAL at 09:05

## 2017-05-12 RX ADMIN — Medication 500 MG: at 09:05

## 2017-05-12 RX ADMIN — POTASSIUM & SODIUM PHOSPHATES POWDER PACK 280-160-250 MG 1 PACKET: 280-160-250 PACK at 08:05

## 2017-05-12 RX ADMIN — STANDARDIZED SENNA CONCENTRATE AND DOCUSATE SODIUM 1 TABLET: 8.6; 5 TABLET, FILM COATED ORAL at 09:05

## 2017-05-12 RX ADMIN — FUROSEMIDE 40 MG: 10 INJECTION, SOLUTION INTRAVENOUS at 09:05

## 2017-05-12 RX ADMIN — POTASSIUM & SODIUM PHOSPHATES POWDER PACK 280-160-250 MG 1 PACKET: 280-160-250 PACK at 05:05

## 2017-05-12 RX ADMIN — POTASSIUM & SODIUM PHOSPHATES POWDER PACK 280-160-250 MG 1 PACKET: 280-160-250 PACK at 09:05

## 2017-05-12 RX ADMIN — ASPIRIN 81 MG CHEWABLE TABLET 81 MG: 81 TABLET CHEWABLE at 08:05

## 2017-05-12 RX ADMIN — POTASSIUM & SODIUM PHOSPHATES POWDER PACK 280-160-250 MG 1 PACKET: 280-160-250 PACK at 01:05

## 2017-05-12 RX ADMIN — FUROSEMIDE 40 MG: 10 INJECTION, SOLUTION INTRAVENOUS at 01:05

## 2017-05-12 NOTE — PLAN OF CARE
Problem: Patient Care Overview  Goal: Plan of Care Review  Outcome: Ongoing (interventions implemented as appropriate)  Spoke with the patient and her daughter regarding the plan of care. Encouraged patient to use  services if needed for better communication. Patient has no complaints of pain at this time. She is able to make her needs known. Planning on discharge by Sunday. Chest tune and left IJ TLC remains in place. Will continue to monitor.

## 2017-05-12 NOTE — PLAN OF CARE
Problem: Physical Therapy Goal  Goal: Physical Therapy Goal  Goals to be met by: 17     Patient will increase functional independence with mobility by performin. Supine to sit with MInimal Assistance - met  2. Sit to supine with MInimal Assistance - met  3. Rolling to Left and Right with Minimal Assistance. - met  4. Sit to stand transfer with Supervision- met  5. Bed to chair transfer with Supervision using no AD- met  6. Gait x 150 feet with Stand-by Assistance using no AD. -met  7. Lower extremity exercise program x15 reps per handout, with assistance as needed- not met   HEP to be brought to pt next scheduled visit.  Haley Marsh, PTA

## 2017-05-12 NOTE — PROGRESS NOTES
Progress Note  Cardiothoracic Surgery    Admit Date: 5/8/2017  Post-operative Day: 4 Days Post-Op  Hospital Day: 5    SUBJECTIVE: no acute events overnight. Afib on monitor     Follow-up For: Procedure(s) (LRB):  REPLACEMENT-VALVE-AORTIC (N/A)  REPLACEMENT-VALVE-MITRAL (N/A)    Scheduled Meds:   amiodarone  400 mg Oral BID    ascorbic acid (vitamin C)  500 mg Oral BID    aspirin  81 mg Oral Daily    famotidine  20 mg Oral BID    ferrous sulfate  325 mg Oral Daily    furosemide  40 mg Intravenous TID    metoprolol  5 mg Intravenous Once    metoprolol tartrate  25 mg Oral BID    omega-3 fatty acids-fish oil  1 capsule Oral Daily    potassium, sodium phosphates  1 packet Oral QID (WM & HS)    senna-docusate 8.6-50 mg  1 tablet Oral BID     Infusions/Drips:     PRN Meds: acetaminophen, albuterol-ipratropium 2.5mg-0.5mg/3mL, calcium carbonate, dextrose 50%, dextrose 50%, glucagon (human recombinant), glucose, glucose, hydrALAZINE, insulin aspart, morphine, ondansetron, tramadol, tramadol    Review of patient's allergies indicates:  No Known Allergies    OBJECTIVE:     Vital Signs (Most Recent)  Temp: 98.1 °F (36.7 °C) (05/12/17 1200)  Pulse: 70 (05/12/17 1200)  Resp: 17 (05/12/17 1200)  BP: 92/61 (05/12/17 1200)  SpO2: 98 % (05/12/17 1200)    Admission Weight: 69.4 kg (153 lb) (05/08/17 0542)   Most Recent Weight: 66.9 kg (147 lb 7.8 oz) (05/12/17 0500)    Vital Signs Range (Last 24H):  Temp:  [98.1 °F (36.7 °C)-99 °F (37.2 °C)]   Pulse:  [58-75]   Resp:  [16-17]   BP: ()/(57-66)   SpO2:  [93 %-98 %]     I & O (Last 24H):    Intake/Output Summary (Last 24 hours) at 05/12/17 1355  Last data filed at 05/12/17 0500   Gross per 24 hour   Intake             1106 ml   Output             1080 ml   Net               26 ml     Physical Exam:  General: no distress  Lungs:  clear to auscultation bilaterally and normal respiratory effort  Chest Wall: no tenderness  Heart: regular rate and rhythm, S1, S2 normal, no  murmur, rub or gallop  Abdomen: soft, non-tender   Skin: Skin color, texture, turgor normal. No rashes or lesions  Neurologic: Alert and oriented. Thought content appropriate    Wound/Incision:  clean, dry, intact    Laboratory:  CBC:     Recent Labs  Lab 05/11/17  0459 05/12/17  0552   WBC 8.82  --    RBC 3.67*  --    HGB 11.1* 11.9*   HCT 33.3* 35.8*   *  --    MCV 91  --    MCH 30.2  --    MCHC 33.3  --      BMP:     Recent Labs  Lab 05/12/17  0552         K 3.3*      CO2 24   BUN 12   CREATININE 0.7   CALCIUM 8.8   MG 2.3       Diagnostic Results:  X-Ray: Reviewed    ASSESSMENT/PLAN:     Active Hospital Problems    Diagnosis    *S/P AVR (aortic valve replacement)    Hypophosphatemia    Paroxysmal a-fib    Encounter for weaning from ventilator    Aortic stenosis    Stress hyperglycemia    S/P MVR (mitral valve replacement)    Mitral valve stenosis, rheumatic    Rheumatic aortic stenosis    History of MI (myocardial infarction)       Plan:   Sternal precautions  PT/OT  Ambulate  Monitor electrolytes and replace prn  accuchecks QID  Daily weights  1800 anna marie ADA diet  Hypophosphatemia: replaced   Lasix continued at 40mg TID   Holding today's Coumadin for INR of 4.9  Post op Afib: PO amiodarone   Plural CT removed  Discharge planning ongoing    RUBEN Cruz-BC  CTS

## 2017-05-12 NOTE — PT/OT/SLP PROGRESS
Physical Therapy  Treatment    Rea Ibarra   MRN: [id number]   Admitting Diagnosis: S/P AVR (aortic valve replacement)    PT Received On: 05/12/17  PT Start Time: 0850     PT Stop Time: 0915    PT Total Time (min): 25 min       Billable Minutes:  Gait [id number] and Therapeutic Activity 10    Treatment Type: Treatment  PT/PTA: PTA     PTA Visit Number: 2       General Precautions: Standard, fall, sternal  Orthopedic Precautions: N/A     Do you have any cultural, spiritual, Latter-day conflicts, given your current situation?: none noted     Subjective:  Communicated with RN prior to session.  Pt agreeable to PT.  Pt stated she was anxious to be discharged because she missed her children.    Objective:   Patient found with: central line, chest tube, telemetry    Functional Mobility:  Bed Mobility:   Rolling/Turning to Left: Independent  Rolling/Turning Right: Independent  Scooting/Bridging: Independent  Supine to Sit: Independent  Sit to Supine: Independent    Transfers:  Sit <> Stand Assistance: Independent  Sit <> Stand Assistive Device: No Assistive Device    Gait:   Gait Distance: 400 feet  Assistance 1: Independent  Gait Assistive Device: No device  Gait Pattern: reciprocal  Gait Deviation(s):  (none noted)    Therapeutic Activities and Exercises:  Sternal precautions were reviewed with pt and reinforced during functional mobility.  Pt was educated in the use of IS.    AM-PAC 6 CLICK MOBILITY  How much help from another person does this patient currently need?   1 = Unable, Total/Dependent Assistance  2 = A lot, Maximum/Moderate Assistance  3 = A little, Minimum/Contact Guard/Supervision  4 = None, Modified [place]/Independent    Turning over in bed (including adjusting bedclothes, sheets and blankets)?: 4  Sitting down on and standing up from a chair with arms (e.g., wheelchair, bedside commode, etc.): 4  Moving from lying on back to sitting on the side of the bed?: 4  Moving to and from a bed to a chair  (including a wheelchair)?: 4  Need to walk in hospital room?: 4  Climbing 3-5 steps with a railing?: 3  Total Score: 23    AM-PAC Raw Score CMS G-Code Modifier Level of Impairment Assistance   6 % Total / Unable   7 - 9 CM 80 - 100% Maximal Assist   10 - 14 CL 60 - 80% Moderate Assist   15 - 19 CK 40 - 60% Moderate Assist   20 - 22 CJ 20 - 40% Minimal Assist   23 CI 1-20% SBA / CGA   24 CH 0% Independent/ Mod I     Patient left with bed in chair position with all lines intact, call button in reach and RN notified.    Assessment:  Rea Ibarra is a 42 y.o. female with a medical diagnosis of S/P AVR (aortic valve replacement) and presents with decreased functional mobility and impairments as listed below.    Rehab identified problem list/impairments: Rehab identified problem list/impairments: weakness, impaired endurance, pain, impaired cardiopulmonary response to activity    Rehab potential is good.    Activity tolerance: Good    Discharge recommendations: Discharge Facility/Level Of Care Needs: home with home health     Barriers to discharge: Barriers to Discharge: Inaccessible home environment, Decreased caregiver support    Equipment recommendations: Equipment Needed After Discharge: none     GOALS:   Physical Therapy Goals        Problem: Physical Therapy Goal    Goal Priority Disciplines Outcome Goal Variances Interventions   Physical Therapy Goal     PT/OT, PT Ongoing (interventions implemented as appropriate)     Description:  Goals to be met by: 17      Patient will increase functional independence with mobility by performin. Supine to sit with MInimal Assistance - met  2. Sit to supine with MInimal Assistance - met  3. Rolling to Left and Right with Minimal Assistance. - met  4. Sit to stand transfer with Supervision- met  5. Bed to chair transfer with Supervision using no AD-  met  6. Gait  x 150 feet with Stand-by Assistance using no AD. -met  7. Lower extremity exercise program x15  reps per handout, with assistance as needed- not met                  PLAN:    Patient to be seen 5 x/week  to address the above listed problems via gait training, therapeutic activities, therapeutic exercises  Plan of Care expires: 06/06/17  Plan of Care reviewed with: patient    Halye HESS Maximo, PTA  05/12/2017

## 2017-05-12 NOTE — PLAN OF CARE
Problem: Patient Care Overview  Goal: Plan of Care Review  Outcome: Ongoing (interventions implemented as appropriate)  Patient ambulating independently, fall precautions in place.Continuing to encourage sternal precautions, IS, and ambulation. Acetaminophen administered for complaints of muscle soreness. Pt verbalizes satisfaction with relief measures. Plan of care discussed with pt and daughter. Both verbalize understanding. Discussed medications and care. Patient has no questions at this time. Will continue to monitor.

## 2017-05-12 NOTE — PLAN OF CARE
RAJAN called pt's daughter Marilyn to inform her that pt needs to see Dr. Brown on Monday, May 15, 2017 at 1:50pm. She will be set up with their coumadin clinic and get PT/INR drawn that day. Daughter agrees with plan. Rajan also informed her MD's office has epic. No need to fax pt's hospital stay to Md office.

## 2017-05-13 ENCOUNTER — NURSE TRIAGE (OUTPATIENT)
Dept: ADMINISTRATIVE | Facility: CLINIC | Age: 42
End: 2017-05-13

## 2017-05-13 VITALS
RESPIRATION RATE: 18 BRPM | DIASTOLIC BLOOD PRESSURE: 65 MMHG | SYSTOLIC BLOOD PRESSURE: 105 MMHG | HEART RATE: 61 BPM | OXYGEN SATURATION: 98 % | BODY MASS INDEX: 26.25 KG/M2 | TEMPERATURE: 98 F | HEIGHT: 62 IN | WEIGHT: 142.63 LBS

## 2017-05-13 LAB
ANION GAP SERPL CALC-SCNC: 13 MMOL/L
BASOPHILS # BLD AUTO: 0.04 K/UL
BASOPHILS NFR BLD: 0.5 %
BUN SERPL-MCNC: 15 MG/DL
CALCIUM SERPL-MCNC: 9.4 MG/DL
CHLORIDE SERPL-SCNC: 101 MMOL/L
CO2 SERPL-SCNC: 25 MMOL/L
CREAT SERPL-MCNC: 0.9 MG/DL
DIFFERENTIAL METHOD: NORMAL
EOSINOPHIL # BLD AUTO: 0.1 K/UL
EOSINOPHIL NFR BLD: 1.3 %
ERYTHROCYTE [DISTWIDTH] IN BLOOD BY AUTOMATED COUNT: 13.2 %
EST. GFR  (AFRICAN AMERICAN): >60 ML/MIN/1.73 M^2
EST. GFR  (NON AFRICAN AMERICAN): >60 ML/MIN/1.73 M^2
GLUCOSE SERPL-MCNC: 89 MG/DL
HCT VFR BLD AUTO: 39.5 %
HGB BLD-MCNC: 13.4 G/DL
INR PPP: 2.6
LYMPHOCYTES # BLD AUTO: 1.5 K/UL
LYMPHOCYTES NFR BLD: 18.6 %
MAGNESIUM SERPL-MCNC: 2.3 MG/DL
MCH RBC QN AUTO: 30 PG
MCHC RBC AUTO-ENTMCNC: 33.9 %
MCV RBC AUTO: 89 FL
MONOCYTES # BLD AUTO: 0.9 K/UL
MONOCYTES NFR BLD: 11.4 %
NEUTROPHILS # BLD AUTO: 5.6 K/UL
NEUTROPHILS NFR BLD: 68 %
PHOSPHATE SERPL-MCNC: 3.5 MG/DL
PLATELET # BLD AUTO: 251 K/UL
PMV BLD AUTO: 9.7 FL
POTASSIUM SERPL-SCNC: 3.3 MMOL/L
PROTHROMBIN TIME: 26.2 SEC
RBC # BLD AUTO: 4.46 M/UL
SODIUM SERPL-SCNC: 139 MMOL/L
WBC # BLD AUTO: 8.17 K/UL

## 2017-05-13 PROCEDURE — 63600175 PHARM REV CODE 636 W HCPCS: Performed by: NURSE PRACTITIONER

## 2017-05-13 PROCEDURE — 83735 ASSAY OF MAGNESIUM: CPT

## 2017-05-13 PROCEDURE — 85025 COMPLETE CBC W/AUTO DIFF WBC: CPT

## 2017-05-13 PROCEDURE — 25000003 PHARM REV CODE 250: Performed by: NURSE PRACTITIONER

## 2017-05-13 PROCEDURE — 25000003 PHARM REV CODE 250

## 2017-05-13 PROCEDURE — 80048 BASIC METABOLIC PNL TOTAL CA: CPT

## 2017-05-13 PROCEDURE — 84100 ASSAY OF PHOSPHORUS: CPT

## 2017-05-13 PROCEDURE — 85610 PROTHROMBIN TIME: CPT

## 2017-05-13 RX ORDER — ASCORBIC ACID 500 MG
500 TABLET ORAL 2 TIMES DAILY
COMMUNITY
Start: 2017-05-13 | End: 2019-12-18

## 2017-05-13 RX ORDER — NAPROXEN SODIUM 220 MG/1
81 TABLET, FILM COATED ORAL DAILY
Refills: 0 | COMMUNITY
Start: 2017-05-13 | End: 2019-12-18

## 2017-05-13 RX ORDER — AMIODARONE HYDROCHLORIDE 400 MG/1
400 TABLET ORAL 2 TIMES DAILY
Qty: 60 TABLET | Refills: 0 | Status: SHIPPED | OUTPATIENT
Start: 2017-05-13 | End: 2017-06-06 | Stop reason: ALTCHOICE

## 2017-05-13 RX ORDER — FAMOTIDINE 20 MG/1
20 TABLET, FILM COATED ORAL 2 TIMES DAILY
Qty: 60 TABLET | Refills: 0 | Status: SHIPPED | OUTPATIENT
Start: 2017-05-13 | End: 2019-12-18

## 2017-05-13 RX ORDER — FUROSEMIDE 20 MG/1
20 TABLET ORAL 2 TIMES DAILY
Qty: 60 TABLET | Refills: 0 | Status: SHIPPED | OUTPATIENT
Start: 2017-05-13 | End: 2017-06-06 | Stop reason: ALTCHOICE

## 2017-05-13 RX ORDER — DOCUSATE SODIUM 100 MG/1
100 CAPSULE, LIQUID FILLED ORAL 2 TIMES DAILY
Status: DISCONTINUED | OUTPATIENT
Start: 2017-05-13 | End: 2017-05-13 | Stop reason: HOSPADM

## 2017-05-13 RX ORDER — TRAMADOL HYDROCHLORIDE 50 MG/1
50 TABLET ORAL EVERY 6 HOURS PRN
Qty: 60 TABLET | Refills: 0 | Status: SHIPPED | OUTPATIENT
Start: 2017-05-13 | End: 2017-05-23

## 2017-05-13 RX ORDER — METOPROLOL TARTRATE 25 MG/1
25 TABLET, FILM COATED ORAL 2 TIMES DAILY
Qty: 60 TABLET | Refills: 4 | Status: SHIPPED | OUTPATIENT
Start: 2017-05-13 | End: 2017-06-06 | Stop reason: SDUPTHER

## 2017-05-13 RX ORDER — SODIUM,POTASSIUM PHOSPHATES 280-250MG
1 POWDER IN PACKET (EA) ORAL
Refills: 0 | COMMUNITY
Start: 2017-05-13 | End: 2017-06-06 | Stop reason: ALTCHOICE

## 2017-05-13 RX ORDER — WARFARIN 3 MG/1
3 TABLET ORAL DAILY
Status: DISCONTINUED | OUTPATIENT
Start: 2017-05-13 | End: 2017-05-13 | Stop reason: HOSPADM

## 2017-05-13 RX ORDER — DOCUSATE SODIUM 100 MG/1
100 CAPSULE, LIQUID FILLED ORAL 2 TIMES DAILY
Refills: 0 | COMMUNITY
Start: 2017-05-13 | End: 2017-06-06 | Stop reason: ALTCHOICE

## 2017-05-13 RX ORDER — WARFARIN 3 MG/1
3 TABLET ORAL DAILY
Qty: 30 TABLET | Refills: 3 | Status: SHIPPED | OUTPATIENT
Start: 2017-05-13 | End: 2017-06-06 | Stop reason: SDUPTHER

## 2017-05-13 RX ADMIN — DOCUSATE SODIUM 100 MG: 100 CAPSULE, LIQUID FILLED ORAL at 10:05

## 2017-05-13 RX ADMIN — Medication 1 CAPSULE: at 08:05

## 2017-05-13 RX ADMIN — FUROSEMIDE 40 MG: 10 INJECTION, SOLUTION INTRAVENOUS at 04:05

## 2017-05-13 RX ADMIN — STANDARDIZED SENNA CONCENTRATE AND DOCUSATE SODIUM 1 TABLET: 8.6; 5 TABLET, FILM COATED ORAL at 08:05

## 2017-05-13 RX ADMIN — Medication 500 MG: at 08:05

## 2017-05-13 RX ADMIN — ASPIRIN 81 MG CHEWABLE TABLET 81 MG: 81 TABLET CHEWABLE at 08:05

## 2017-05-13 RX ADMIN — FERROUS SULFATE TAB EC 325 MG (65 MG FE EQUIVALENT) 325 MG: 325 (65 FE) TABLET DELAYED RESPONSE at 08:05

## 2017-05-13 RX ADMIN — METOPROLOL TARTRATE 25 MG: 25 TABLET ORAL at 08:05

## 2017-05-13 RX ADMIN — AMIODARONE HYDROCHLORIDE 400 MG: 200 TABLET ORAL at 08:05

## 2017-05-13 RX ADMIN — FAMOTIDINE 20 MG: 20 TABLET, FILM COATED ORAL at 08:05

## 2017-05-13 NOTE — TELEPHONE ENCOUNTER
Reason for Disposition   Caller has URGENT medication question about med that PCP prescribed and triager unable to answer question    Protocols used: ST MEDICATION QUESTION CALL-A-AH    Caller states that a few of patient's post op medications require a prior authorization and 's office has not sent it in to the pharmacy yet. Caller transferred to the  to speak to on call Cardiology Surgeon and advised to call back if no contact after 30 min. Please contact caller directly to discuss any further care advice.

## 2017-05-13 NOTE — PLAN OF CARE
Problem: Patient Care Overview  Goal: Plan of Care Review  Outcome: Ongoing (interventions implemented as appropriate)  Spoke with the patient regarding the plan of care. Noted that the patient appears to be much more comfortable that her chest tube has been removed. She is up and ambulating, has no complaints of pain at this time. Will continue to monitor. Ongoing teaching on infection prevention and safety.

## 2017-05-14 NOTE — DISCHARGE SUMMARY
DATE OF ADMISSION:  05/08/2017    DATE OF DISCHARGE:  05/13/2017    DIAGNOSES:  1.  Rheumatic heart disease.  2.  Mixed mitral stenosis and mitral regurgitation.  3.  Mixed aortic stenosis and aortic insufficiency.  4.  Chronic systolic and diastolic congestive heart failure with New York Heart   Association class III symptoms.  5.  Syncope.  6.  Angina.  7.  Atrial fibrillation, postoperatively -- resolved.    OPERATION PERFORMED:  On 05/08/2017 aortic valve replacement with a 20 mm   Medtronic ATS Open Pivot mechanical prosthesis and mitral valve replacement with   a 29 mm [employer] Masters Series mechanical prosthesis.    HISTORY OF PRESENT ILLNESS:  Ms. Ibarra is a 42-year-old female who we had been   following for approximately 1 year.  On our first visit, she was minimally   symptomatic.  Unfortunately, she has had progressive dyspnea on exertion and   decreased exercise tolerance.  She was forced to quit work approximately 4   months ago due to her symptoms.  She underwent a thorough evaluation, which   included echocardiography.  She presented for elective aortic and mitral valve   replacement.  She wishes to have mechanical valves.    HOSPITAL COURSE:  On 05/08/2017 the patient was taken to the Operating Room for   the above stated procedure.  Please see the previously dictated operative report   for complete details.  Postoperatively, the patient was taken from the   Operating Room to the ICU, where her vital signs were monitored and pain was   kept under control.  She was weaned from her drips and extubated in the ICU per   protocol.  She was transferred to the Cardiac Step-Down floor, when she was felt   to be hemodynamically stable.  She did have postoperative AFib, that responded   to IV amiodarone and subsequent transition to oral.  The remainder of her   hospital course has been uncomplicated.  Her chest tubes were all removed, at   which time they had drained a minimal amount.  She was initiated on    anticoagulation on postoperative day 1.  This was started with heparin and then   followed with Coumadin.  She did become supratherapeutic on her INR and required   holding Coumadin for approximately 48 hours.  This was reinstituted on the day   of discharge.  She will have Dr. Brown, her cardiologist, follow her INRs.  She   will see him on Monday, 05/15/2017, for repeat labs.  On the day of discharge,   she was ambulating unassisted, pain was well controlled with Ultram, and she was   tolerating her diet without nausea or vomiting.    MOBILITY AND ACTIVITY:  As tolerated.  She may shower.  No heavy lifting of   greater than 5 pounds and no driving.    DIET:  Cardiac prudent, with a 1500 mL fluid restriction.    WOUND CARE INSTRUCTIONS:  Check for redness, swelling, drainage around the   incision or wound.  She is to call for any obvious bleeding, drainage, pus from   the wound, unusual problems or difficulties, or temperature of greater than 101   degrees.    Follow up with Dr. Ortega in 3 weeks.  Prior to this appointment, the patient   will have a chest x-ray and EKG.    The patient was not placed on an ACE inhibitor due to concern for hypotension.    DISCHARGE MEDICATIONS:  Amiodarone 400 mg b.i.d., aspirin 81 mg daily, vitamin C   500 mg b.i.d., docusate sodium 100 mg b.i.d. p.r.n., Pepcid 20 mg b.i.d.,   ferrous sulfate 325 daily, Lasix 20 mg b.i.d. for 7 days, then decrease to 20 mg   daily, metoprolol 25 mg b.i.d., fish oil 1000 mg daily, potassium phosphate   packets q.i.d. with meals for 30 days, Ultram 50 mg every 6 hours as needed for   pain, Coumadin 3 mg daily.    At the time of discharge, the patient's vital signs were stable, she was   afebrile and in normal sinus rhythm.    The patient had no evidence of coronary artery disease, so a statin was not   initiated.    DICTATED BY:  Klarissa Paula NP.      BECKY  dd: 05/13/2017 11:54:53 (CDT)  td: 05/13/2017 22:18:17 (CDT)  Doc ID   #[id number]  Job ID  #[id number]    CC:

## 2017-05-16 NOTE — PHYSICIAN QUERY
"PT Name: Rea Ibarra  MR #: [id number]    Physician Query Form - Heart  Condition Clarification     CDS/: Eula Montes RN CCDS              Contact information: [email or web address]  This form is a permanent document in the medical record.     Query Date: May 16, 2017    By submitting this query, we are merely seeking further clarification of documentation. Please utilize your independent clinical judgment when addressing the question(s) below.    The medical record contains the following   Indicators     Supporting Clinical Findings Location in Medical Record    BNP     X EF EF 60%, nml systolic, abnormal diastolic 5/4 h/p    Radiology findings      Echo Results      "Ascites" documented      "SOB" or "CURIEL" documented      "Hypoxia" documented     X Heart Failure documented Chronic Diastolic HF    Chronic Systolic and Diastolic congestive heart failure  5/11 query     5/13 d/c summary    "Edema" documented     X Diuretics/Meds Furosemide 40mg IV 3 x a day  Metoprolol 25 mg PO 2 x a day mar    Treatment:      Other:          Provider, please specify diagnosis or diagnoses associated with above clinical findings.                               [ X ] Chronic Diastolic Heart Failure (EF > 40)*  [  ] Chronic Combined Systolic and Diastolic Heart Failure  [  ] Other Type of Heart Failure (please specify type): _________________________  [  ] Heart Failure Ruled Out  [  ] Other (please specify): ___________________________________  [  ] Clinically Undetermined            *American Heart Association                                                                                                          Please document in your progress notes daily for the duration of treatment until resolved and include in your discharge summary.    "

## 2017-06-06 ENCOUNTER — HOSPITAL ENCOUNTER (OUTPATIENT)
Dept: RADIOLOGY | Facility: HOSPITAL | Age: 42
Discharge: HOME | End: 2017-06-06
Attending: THORACIC SURGERY (CARDIOTHORACIC VASCULAR SURGERY)
Payer: MEDICAID

## 2017-06-06 ENCOUNTER — OFFICE VISIT (OUTPATIENT)
Dept: CARDIOTHORACIC SURGERY | Facility: CLINIC | Age: 42
End: 2017-06-06
Payer: MEDICAID

## 2017-06-06 ENCOUNTER — HOSPITAL ENCOUNTER (OUTPATIENT)
Dept: CARDIOLOGY | Facility: CLINIC | Age: 42
Discharge: HOME | End: 2017-06-06
Payer: MEDICAID

## 2017-06-06 VITALS
DIASTOLIC BLOOD PRESSURE: 82 MMHG | BODY MASS INDEX: 25.9 KG/M2 | HEIGHT: 63 IN | TEMPERATURE: 98 F | WEIGHT: 146.19 LBS | SYSTOLIC BLOOD PRESSURE: 127 MMHG | HEART RATE: 65 BPM | OXYGEN SATURATION: 100 %

## 2017-06-06 DIAGNOSIS — Z95.2 S/P AVR (AORTIC VALVE REPLACEMENT): ICD-10-CM

## 2017-06-06 DIAGNOSIS — Z95.2 S/P MVR (MITRAL VALVE REPLACEMENT): ICD-10-CM

## 2017-06-06 DIAGNOSIS — Z95.2 S/P MVR (MITRAL VALVE REPLACEMENT): Primary | ICD-10-CM

## 2017-06-06 PROCEDURE — 71020 XR CHEST PA AND LATERAL: CPT | Mod: 26,,, | Performed by: RADIOLOGY

## 2017-06-06 PROCEDURE — 93010 ELECTROCARDIOGRAM REPORT: CPT | Mod: S$PBB,,, | Performed by: INTERNAL MEDICINE

## 2017-06-06 PROCEDURE — 99213 OFFICE O/P EST LOW 20 MIN: CPT | Mod: PBBFAC,25 | Performed by: THORACIC SURGERY (CARDIOTHORACIC VASCULAR SURGERY)

## 2017-06-06 PROCEDURE — 99024 POSTOP FOLLOW-UP VISIT: CPT | Mod: ,,, | Performed by: THORACIC SURGERY (CARDIOTHORACIC VASCULAR SURGERY)

## 2017-06-06 PROCEDURE — 99999 PR PBB SHADOW E&M-EST. PATIENT-LVL III: CPT | Mod: PBBFAC,,, | Performed by: THORACIC SURGERY (CARDIOTHORACIC VASCULAR SURGERY)

## 2017-06-06 RX ORDER — WARFARIN 3 MG/1
3 TABLET ORAL DAILY
Qty: 30 TABLET | Refills: 3 | Status: SHIPPED | OUTPATIENT
Start: 2017-06-06 | End: 2019-12-18 | Stop reason: SDUPTHER

## 2017-06-06 RX ORDER — METOPROLOL TARTRATE 25 MG/1
25 TABLET, FILM COATED ORAL 2 TIMES DAILY
Qty: 60 TABLET | Refills: 4 | Status: SHIPPED | OUTPATIENT
Start: 2017-06-06 | End: 2020-09-11

## 2017-06-06 RX ORDER — METOPROLOL TARTRATE 25 MG/1
25 TABLET, FILM COATED ORAL 2 TIMES DAILY
Qty: 60 TABLET | Refills: 4 | Status: SHIPPED | OUTPATIENT
Start: 2017-06-06 | End: 2017-06-06 | Stop reason: SDUPTHER

## 2017-06-06 NOTE — PROGRESS NOTES
Patient seen and examined. She is progressively increasing activity. No significant complaints.     Sternum: stable, incision CDI  Chest xray: Acceptable post op chest  EKG: NSR     Assessment:  S/P mechanical AVR/MVR     Plan:  Can begin driving   Can resume light lifting 6 weeks after operation   Can begin cardiac rehab 6 weeks after operation  We will refer to  at [facility] to assume care   DC lasix. Potassium, amio and pain meds   Refilled lopressor and coumadin                       CTS Attending Note:    I have personally taken the history and examined this patient and agree with the NP's note as stated above.

## 2017-06-06 NOTE — LETTER
[facility]  [address]  [facility] [address]  Phone: [phone] June 6, 2017        Sav Brown MD  [address] [place] [address]  [place] [address]    Patient: Rea Ibarra   MR Number: [id number]   YOB: 1975   Date of Visit: 6/6/2017     Dear Dr. Brown:    I had the pleasure of seeing your patient Ms. Rea Ibarra, in clinic today.  As  you know, she is a very pleasant 42-year-old woman who initially was found to have progressive dyspnea on exertion as well as exercise intolerance. She originally elected to avoid surgical correction of what was most likely rheumatic heart disease, but her symptoms progressed to the point that she had to stop working. On May 8th, she underwent aortic and mitral valve replacement. In accordance with her wishes, mechanical prostheses were used. Her postoperative course was unremarkable, and she was ultimately discharged to home.    Today,she returned for routine follow-up, and in clinic today, Ms. Ibarra looked fabulous. She was without complaint, and in fact reports an improved exercise tolerance even at this early postoperative date.    On physical examination, her sternum appeared to be healing nicely. Her chest x-ray was clear.  Her EKG demonstrated a normal sinus rhythm.    Overall, I am very pleased with how well Ms. Ibarra has done.  As a result, I will not schedule her to follow up with me.  Certainly, should you or she have any questions or concerns, please do not hesitate to give me a call.  Of note, I have included a copy of her operative report for your records. Thank you again for sending her to me.    Sincerely,        Farhat Ortega MD   Chief, Division of Thoracic & Cardiovascular Surgery  [facility]      PEP/ecs        CC  Elena Haas NP

## 2017-07-24 ENCOUNTER — RESEARCH ENCOUNTER (OUTPATIENT)
Dept: RESEARCH | Facility: HOSPITAL | Age: 42
End: 2017-07-24

## 2017-07-24 NOTE — PROGRESS NOTES
Telephone call to Dr. Sav Brown's office and spoke to Ms AngelaChidi Ibarra had a INR check on 7/17/2017 and is scheduled to see the doctor on 7/31/2017.

## 2017-10-09 RX ORDER — WARFARIN 3 MG/1
TABLET ORAL
Qty: 30 TABLET | Refills: 0 | OUTPATIENT
Start: 2017-10-09

## 2018-02-22 PROBLEM — D64.9 SEVERE ANEMIA: Status: ACTIVE | Noted: 2018-02-22

## 2018-02-22 PROBLEM — N93.9 ABNORMAL UTERINE BLEEDING (AUB): Status: ACTIVE | Noted: 2018-02-22

## 2018-03-13 PROBLEM — I35.0 AORTIC STENOSIS: Status: RESOLVED | Noted: 2017-05-08 | Resolved: 2018-03-13

## 2018-03-13 PROBLEM — R73.9 STRESS HYPERGLYCEMIA: Status: RESOLVED | Noted: 2017-05-08 | Resolved: 2018-03-13

## 2018-03-19 PROBLEM — Z98.890 S/P ENDOMETRIAL ABLATION: Status: ACTIVE | Noted: 2018-03-19

## 2019-12-18 PROBLEM — Z95.2: Status: ACTIVE | Noted: 2019-09-05

## 2019-12-18 PROBLEM — Z79.01 CHRONIC ANTICOAGULATION: Status: ACTIVE | Noted: 2019-09-05

## 2019-12-18 PROBLEM — I10 ESSENTIAL HYPERTENSION: Status: ACTIVE | Noted: 2019-09-05

## 2021-05-12 ENCOUNTER — PATIENT MESSAGE (OUTPATIENT)
Dept: RESEARCH | Facility: HOSPITAL | Age: 46
End: 2021-05-12

## 2025-01-02 ENCOUNTER — TELEPHONE (OUTPATIENT)
Dept: CARDIOTHORACIC SURGERY | Facility: CLINIC | Age: 50
End: 2025-01-02
Payer: MEDICAID

## 2025-01-02 NOTE — TELEPHONE ENCOUNTER
Attempted call to pt to schedule appointment with Dr. Ortega. Pt is s/p AVR and MVR with Dr. Ortega in 2017. Per cardiology note, pt being treated for vegetations on mitral valve. Abx end date 1/8. No answer, eft VM with request for call back, and provided my direct number.

## 2025-01-06 ENCOUNTER — TELEPHONE (OUTPATIENT)
Dept: CARDIOTHORACIC SURGERY | Facility: CLINIC | Age: 50
End: 2025-01-06
Payer: MEDICAID

## 2025-01-06 ENCOUNTER — PATIENT MESSAGE (OUTPATIENT)
Dept: CARDIOTHORACIC SURGERY | Facility: CLINIC | Age: 50
End: 2025-01-06
Payer: MEDICAID

## 2025-01-06 NOTE — TELEPHONE ENCOUNTER
Attempted call to pt to schedule consultation appointment. No answer, left VM with request for call back and provided my direct number.

## (undated) DEVICE — SUT PROLENE 5-0 BL C-1 4-24

## (undated) DEVICE — SUT SILK BLK BR. 2 2-60

## (undated) DEVICE — Device

## (undated) DEVICE — SUT PROLENE 5-0 24 C-1 BL

## (undated) DEVICE — SUT ETHIBOND EXCEL 2-0 SH-2

## (undated) DEVICE — SUT PROLENE 3-0 SH DA 36 BL

## (undated) DEVICE — SEE MEDLINE ITEM 146417

## (undated) DEVICE — SPONGE GAUZE 16PLY 4X4

## (undated) DEVICE — CANNULA RETROGRADE CARDIOPLEG

## (undated) DEVICE — FOGERTY SOFT JAW DISP 2/PK

## (undated) DEVICE — TAPE CLOTH SOFT MEDIPORE 4IN

## (undated) DEVICE — CANNULA MULTIPLE PERFUSIONSET

## (undated) DEVICE — SUT 2/0 30IN ETHIBOND

## (undated) DEVICE — KIT SAHARA DRAPE DRAW/LIFT

## (undated) DEVICE — NDL HYPO A BEVEL 22X1 1/2

## (undated) DEVICE — APPLICATOR CHLORAPREP ORN 26ML

## (undated) DEVICE — DRESSING ADH ISLAND 3.6 X 14

## (undated) DEVICE — TRAY CATH UM FOLEY SIL W 16FR

## (undated) DEVICE — WIRE INTRAMYOCARDIAL TEMP

## (undated) DEVICE — DRAIN CHEST DRY SUCTION

## (undated) DEVICE — SUT ETHIBOND XTRA 2-0 SH-2

## (undated) DEVICE — PROBE CATH TEMP 16 FRFOLEY 400

## (undated) DEVICE — SET DECANTER MEDICHOICE

## (undated) DEVICE — GAUZE SPONGE 4X4 12PLY

## (undated) DEVICE — CLOSURE SKIN STERI STRIP 1/2X4

## (undated) DEVICE — SUT 2-0 VICRYL / CT-1

## (undated) DEVICE — TRAY HEART

## (undated) DEVICE — DRAPE INCISE IOBAN 2 23X17IN

## (undated) DEVICE — SYR 30CC LUER LOCK

## (undated) DEVICE — SUT PROLENE 4-0 RB-1 BL MO

## (undated) DEVICE — ELECTRODE PAD DEFIB STERILE

## (undated) DEVICE — SUT SILK 2-0 SH 18IN BLACK

## (undated) DEVICE — SOL 9P NACL IRR PIC IL

## (undated) DEVICE — RETRACTOR OCTOBASE INSERT HOLD

## (undated) DEVICE — DRAPE SLUSH WARMER WITH DISC

## (undated) DEVICE — SUT 2/0 36IN COATED VICRYL

## (undated) DEVICE — SUT PROLENE 4-0 SH BLU 36IN

## (undated) DEVICE — SUT 6 18IN STEEL MONO CCS

## (undated) DEVICE — INSERTS STEALTH FIBRA SIZE 5

## (undated) DEVICE — ELECTRODE REM PLYHSV RETURN 9

## (undated) DEVICE — SUT VICRYL BR 1 GEN 27 CT-1

## (undated) DEVICE — LOOP VESSEL BLUE MAXI

## (undated) DEVICE — SUT VICRYL PLUS 3-0 FS1 27

## (undated) DEVICE — SEE MEDLINE ITEM 152487

## (undated) DEVICE — DRAIN CHANNEL ROUND 19FR

## (undated) DEVICE — DRAPE SPLIT STERILE

## (undated) DEVICE — CONTAINER SPECIMEN STRL 4OZ

## (undated) DEVICE — BLADE SAW STERNAL 5/BX

## (undated) DEVICE — SUT 3-0 CTD VICRYL 27IN PS

## (undated) DEVICE — DRESSING AQUACEL SACRAL 9 X 9